# Patient Record
Sex: MALE | Race: ASIAN | NOT HISPANIC OR LATINO | URBAN - METROPOLITAN AREA
[De-identification: names, ages, dates, MRNs, and addresses within clinical notes are randomized per-mention and may not be internally consistent; named-entity substitution may affect disease eponyms.]

---

## 2023-04-05 ENCOUNTER — INPATIENT (INPATIENT)
Facility: HOSPITAL | Age: 43
LOS: 0 days | Discharge: HOME | DRG: 375 | End: 2023-04-06
Attending: STUDENT IN AN ORGANIZED HEALTH CARE EDUCATION/TRAINING PROGRAM | Admitting: STUDENT IN AN ORGANIZED HEALTH CARE EDUCATION/TRAINING PROGRAM
Payer: MEDICAID

## 2023-04-05 VITALS
RESPIRATION RATE: 18 BRPM | OXYGEN SATURATION: 99 % | SYSTOLIC BLOOD PRESSURE: 96 MMHG | DIASTOLIC BLOOD PRESSURE: 68 MMHG | HEART RATE: 90 BPM | WEIGHT: 153 LBS | TEMPERATURE: 99 F

## 2023-04-05 DIAGNOSIS — R65.10 SYSTEMIC INFLAMMATORY RESPONSE SYNDROME (SIRS) OF NON-INFECTIOUS ORIGIN WITHOUT ACUTE ORGAN DYSFUNCTION: ICD-10-CM

## 2023-04-05 DIAGNOSIS — C20 MALIGNANT NEOPLASM OF RECTUM: ICD-10-CM

## 2023-04-05 DIAGNOSIS — D64.9 ANEMIA, UNSPECIFIED: ICD-10-CM

## 2023-04-05 DIAGNOSIS — K92.2 GASTROINTESTINAL HEMORRHAGE, UNSPECIFIED: ICD-10-CM

## 2023-04-05 DIAGNOSIS — Z90.49 ACQUIRED ABSENCE OF OTHER SPECIFIED PARTS OF DIGESTIVE TRACT: Chronic | ICD-10-CM

## 2023-04-05 DIAGNOSIS — Z90.49 ACQUIRED ABSENCE OF OTHER SPECIFIED PARTS OF DIGESTIVE TRACT: ICD-10-CM

## 2023-04-05 DIAGNOSIS — Z29.9 ENCOUNTER FOR PROPHYLACTIC MEASURES, UNSPECIFIED: ICD-10-CM

## 2023-04-05 DIAGNOSIS — R50.9 FEVER, UNSPECIFIED: ICD-10-CM

## 2023-04-05 LAB
ALBUMIN SERPL ELPH-MCNC: 2.7 G/DL — LOW (ref 3.5–5)
ALP SERPL-CCNC: 123 U/L — HIGH (ref 40–120)
ALT FLD-CCNC: 40 U/L DA — SIGNIFICANT CHANGE UP (ref 10–60)
ANION GAP SERPL CALC-SCNC: 5 MMOL/L — SIGNIFICANT CHANGE UP (ref 5–17)
ANISOCYTOSIS BLD QL: SLIGHT — SIGNIFICANT CHANGE UP
APPEARANCE UR: CLEAR — SIGNIFICANT CHANGE UP
APTT BLD: 30.1 SEC — SIGNIFICANT CHANGE UP (ref 27.5–35.5)
AST SERPL-CCNC: 49 U/L — HIGH (ref 10–40)
BACTERIA # UR AUTO: ABNORMAL /HPF
BASOPHILS # BLD AUTO: 0.02 K/UL — SIGNIFICANT CHANGE UP (ref 0–0.2)
BASOPHILS NFR BLD AUTO: 0.4 % — SIGNIFICANT CHANGE UP (ref 0–2)
BILIRUB SERPL-MCNC: 0.4 MG/DL — SIGNIFICANT CHANGE UP (ref 0.2–1.2)
BILIRUB UR-MCNC: NEGATIVE — SIGNIFICANT CHANGE UP
BLD GP AB SCN SERPL QL: SIGNIFICANT CHANGE UP
BUN SERPL-MCNC: 12 MG/DL — SIGNIFICANT CHANGE UP (ref 7–18)
CALCIUM SERPL-MCNC: 8.4 MG/DL — SIGNIFICANT CHANGE UP (ref 8.4–10.5)
CHLORIDE SERPL-SCNC: 105 MMOL/L — SIGNIFICANT CHANGE UP (ref 96–108)
CO2 SERPL-SCNC: 22 MMOL/L — SIGNIFICANT CHANGE UP (ref 22–31)
COLOR SPEC: YELLOW — SIGNIFICANT CHANGE UP
CREAT SERPL-MCNC: 0.5 MG/DL — SIGNIFICANT CHANGE UP (ref 0.5–1.3)
DIFF PNL FLD: NEGATIVE — SIGNIFICANT CHANGE UP
EGFR: 131 ML/MIN/1.73M2 — SIGNIFICANT CHANGE UP
EOSINOPHIL # BLD AUTO: 0.11 K/UL — SIGNIFICANT CHANGE UP (ref 0–0.5)
EOSINOPHIL NFR BLD AUTO: 2.4 % — SIGNIFICANT CHANGE UP (ref 0–6)
EPI CELLS # UR: ABNORMAL /HPF
FLUAV AG NPH QL: SIGNIFICANT CHANGE UP
FLUBV AG NPH QL: SIGNIFICANT CHANGE UP
GLUCOSE SERPL-MCNC: 113 MG/DL — HIGH (ref 70–99)
GLUCOSE UR QL: NEGATIVE — SIGNIFICANT CHANGE UP
HCT VFR BLD CALC: 18.8 % — CRITICAL LOW (ref 39–50)
HGB BLD-MCNC: 5.9 G/DL — CRITICAL LOW (ref 13–17)
HYPOCHROMIA BLD QL: SLIGHT — SIGNIFICANT CHANGE UP
IMM GRANULOCYTES NFR BLD AUTO: 0.9 % — SIGNIFICANT CHANGE UP (ref 0–0.9)
INR BLD: 1.15 RATIO — SIGNIFICANT CHANGE UP (ref 0.88–1.16)
KETONES UR-MCNC: NEGATIVE — SIGNIFICANT CHANGE UP
LDH SERPL L TO P-CCNC: 244 U/L — HIGH (ref 120–225)
LEUKOCYTE ESTERASE UR-ACNC: NEGATIVE — SIGNIFICANT CHANGE UP
LYMPHOCYTES # BLD AUTO: 0.71 K/UL — LOW (ref 1–3.3)
LYMPHOCYTES # BLD AUTO: 15.2 % — SIGNIFICANT CHANGE UP (ref 13–44)
MACROCYTES BLD QL: SLIGHT — SIGNIFICANT CHANGE UP
MAGNESIUM SERPL-MCNC: 2 MG/DL — SIGNIFICANT CHANGE UP (ref 1.6–2.6)
MANUAL SMEAR VERIFICATION: SIGNIFICANT CHANGE UP
MCHC RBC-ENTMCNC: 29.9 PG — SIGNIFICANT CHANGE UP (ref 27–34)
MCHC RBC-ENTMCNC: 31.4 GM/DL — LOW (ref 32–36)
MCV RBC AUTO: 95.4 FL — SIGNIFICANT CHANGE UP (ref 80–100)
MICROCYTES BLD QL: SLIGHT — SIGNIFICANT CHANGE UP
MONOCYTES # BLD AUTO: 0.88 K/UL — SIGNIFICANT CHANGE UP (ref 0–0.9)
MONOCYTES NFR BLD AUTO: 18.8 % — HIGH (ref 2–14)
NEUTROPHILS # BLD AUTO: 2.91 K/UL — SIGNIFICANT CHANGE UP (ref 1.8–7.4)
NEUTROPHILS NFR BLD AUTO: 62.3 % — SIGNIFICANT CHANGE UP (ref 43–77)
NITRITE UR-MCNC: NEGATIVE — SIGNIFICANT CHANGE UP
NRBC # BLD: 0 /100 WBCS — SIGNIFICANT CHANGE UP (ref 0–0)
OVALOCYTES BLD QL SMEAR: SLIGHT — SIGNIFICANT CHANGE UP
PH UR: 7 — SIGNIFICANT CHANGE UP (ref 5–8)
PLAT MORPH BLD: NORMAL — SIGNIFICANT CHANGE UP
PLATELET # BLD AUTO: 84 K/UL — LOW (ref 150–400)
PLATELET COUNT - ESTIMATE: NORMAL — SIGNIFICANT CHANGE UP
POIKILOCYTOSIS BLD QL AUTO: SLIGHT — SIGNIFICANT CHANGE UP
POLYCHROMASIA BLD QL SMEAR: SLIGHT — SIGNIFICANT CHANGE UP
POTASSIUM SERPL-MCNC: 4.3 MMOL/L — SIGNIFICANT CHANGE UP (ref 3.5–5.3)
POTASSIUM SERPL-SCNC: 4.3 MMOL/L — SIGNIFICANT CHANGE UP (ref 3.5–5.3)
PROT SERPL-MCNC: 5.8 G/DL — LOW (ref 6–8.3)
PROT UR-MCNC: 30 MG/DL
PROTHROM AB SERPL-ACNC: 13.7 SEC — HIGH (ref 10.5–13.4)
RBC # BLD: 1.97 M/UL — LOW (ref 4.2–5.8)
RBC # FLD: 19.5 % — HIGH (ref 10.3–14.5)
RBC BLD AUTO: ABNORMAL
RBC CASTS # UR COMP ASSIST: SIGNIFICANT CHANGE UP /HPF (ref 0–2)
RETICS #: 176.8 K/UL — HIGH (ref 25–125)
RETICS/RBC NFR: 8.9 % — HIGH (ref 0.5–2.5)
SARS-COV-2 RNA SPEC QL NAA+PROBE: SIGNIFICANT CHANGE UP
SODIUM SERPL-SCNC: 132 MMOL/L — LOW (ref 135–145)
SP GR SPEC: 1.01 — SIGNIFICANT CHANGE UP (ref 1.01–1.02)
UROBILINOGEN FLD QL: 1 MG/DL
WBC # BLD: 4.67 K/UL — SIGNIFICANT CHANGE UP (ref 3.8–10.5)
WBC # FLD AUTO: 4.67 K/UL — SIGNIFICANT CHANGE UP (ref 3.8–10.5)
WBC UR QL: SIGNIFICANT CHANGE UP /HPF (ref 0–5)

## 2023-04-05 PROCEDURE — 74174 CTA ABD&PLVS W/CONTRAST: CPT | Mod: 26

## 2023-04-05 PROCEDURE — 99292 CRITICAL CARE ADDL 30 MIN: CPT

## 2023-04-05 PROCEDURE — 99222 1ST HOSP IP/OBS MODERATE 55: CPT

## 2023-04-05 PROCEDURE — 99291 CRITICAL CARE FIRST HOUR: CPT

## 2023-04-05 RX ORDER — SODIUM CHLORIDE 9 MG/ML
500 INJECTION INTRAMUSCULAR; INTRAVENOUS; SUBCUTANEOUS ONCE
Refills: 0 | Status: COMPLETED | OUTPATIENT
Start: 2023-04-05 | End: 2023-04-05

## 2023-04-05 RX ORDER — SPIRONOLACTONE 25 MG/1
0 TABLET, FILM COATED ORAL
Qty: 0 | Refills: 3 | DISCHARGE

## 2023-04-05 RX ORDER — ONDANSETRON 8 MG/1
1 TABLET, FILM COATED ORAL
Refills: 0 | DISCHARGE

## 2023-04-05 RX ORDER — OXYCODONE HYDROCHLORIDE 5 MG/1
2.5 TABLET ORAL EVERY 6 HOURS
Refills: 0 | Status: DISCONTINUED | OUTPATIENT
Start: 2023-04-05 | End: 2023-04-06

## 2023-04-05 RX ORDER — ACETAMINOPHEN 500 MG
975 TABLET ORAL ONCE
Refills: 0 | Status: COMPLETED | OUTPATIENT
Start: 2023-04-05 | End: 2023-04-05

## 2023-04-05 RX ORDER — FUROSEMIDE 40 MG
1 TABLET ORAL
Refills: 0 | DISCHARGE

## 2023-04-05 RX ORDER — DOCUSATE SODIUM 100 MG
1 CAPSULE ORAL
Refills: 0 | DISCHARGE

## 2023-04-05 RX ORDER — FUROSEMIDE 40 MG
0 TABLET ORAL
Qty: 0 | Refills: 0 | DISCHARGE

## 2023-04-05 RX ORDER — SPIRONOLACTONE 25 MG/1
1 TABLET, FILM COATED ORAL
Refills: 0 | DISCHARGE

## 2023-04-05 RX ORDER — LANOLIN ALCOHOL/MO/W.PET/CERES
3 CREAM (GRAM) TOPICAL AT BEDTIME
Refills: 0 | Status: DISCONTINUED | OUTPATIENT
Start: 2023-04-05 | End: 2023-04-06

## 2023-04-05 RX ORDER — ONDANSETRON 8 MG/1
4 TABLET, FILM COATED ORAL EVERY 8 HOURS
Refills: 0 | Status: DISCONTINUED | OUTPATIENT
Start: 2023-04-05 | End: 2023-04-06

## 2023-04-05 RX ORDER — OXYCODONE HYDROCHLORIDE 5 MG/1
5 TABLET ORAL EVERY 6 HOURS
Refills: 0 | Status: DISCONTINUED | OUTPATIENT
Start: 2023-04-05 | End: 2023-04-06

## 2023-04-05 RX ORDER — OMEPRAZOLE 10 MG/1
1 CAPSULE, DELAYED RELEASE ORAL
Refills: 0 | DISCHARGE

## 2023-04-05 RX ORDER — PANTOPRAZOLE SODIUM 20 MG/1
40 TABLET, DELAYED RELEASE ORAL
Refills: 0 | Status: DISCONTINUED | OUTPATIENT
Start: 2023-04-05 | End: 2023-04-06

## 2023-04-05 RX ORDER — OXYCODONE AND ACETAMINOPHEN 5; 325 MG/1; MG/1
1 TABLET ORAL
Refills: 0 | DISCHARGE

## 2023-04-05 RX ORDER — ACETAMINOPHEN 500 MG
650 TABLET ORAL EVERY 6 HOURS
Refills: 0 | Status: DISCONTINUED | OUTPATIENT
Start: 2023-04-05 | End: 2023-04-06

## 2023-04-05 RX ADMIN — PANTOPRAZOLE SODIUM 40 MILLIGRAM(S): 20 TABLET, DELAYED RELEASE ORAL at 18:58

## 2023-04-05 RX ADMIN — SODIUM CHLORIDE 500 MILLILITER(S): 9 INJECTION INTRAMUSCULAR; INTRAVENOUS; SUBCUTANEOUS at 18:50

## 2023-04-05 RX ADMIN — Medication 975 MILLIGRAM(S): at 17:14

## 2023-04-05 NOTE — CHART NOTE - NSCHARTNOTEFT_GEN_A_CORE
Patient's BP has been ranging in 75~87/49~60 mm Hg.    According to [facility] notes outpatient (when Pt has higher hemoglobin levels), patient's SBP has been ranging in the 80s to 90s for months.  Patient also admits to having low BP for months.    Patient, on admission, has lightheadedness and dyspnea on exertion from anemia.  With BP of 75/49mm Hg. He does not complain of worsening symptoms.  Patient denies any symptoms of possible infection including dysuria, inc urinary frequency, cough, malaise, abdominal pain.    Will try to aim to keep SBP>80.  He will need 2U of pRBC.   Will sign out to the night team.    Due to fever as well, sent cultures.

## 2023-04-05 NOTE — ED ADULT NURSE NOTE - ED STAT RN HANDOFF DETAILS
Patient is alert and oriented x 4, not in any form of distress. CT done and waiting for result. Vital signs referred to MAXX Fleming, no new orders as of the moment. To administer 2nd unit of PRBC. Endorsed to MAXX Sanchez for continuity of care.

## 2023-04-05 NOTE — CONSULT NOTE ADULT - SUBJECTIVE AND OBJECTIVE BOX
[facility] GI CONSULTATION    Patient is a 42y old  Male who presents with a chief complaint of   HPI:      PMH/PSH:  PAST MEDICAL & SURGICAL HISTORY:      FH:  FAMILY HISTORY:      MEDS:  MEDICATIONS  (STANDING):    MEDICATIONS  (PRN):    Allergies    No Known Allergies    Intolerances      ROS: A detailed set of ROS were asked and negative except those outlined in GI HPI.  ______________________________________________________________________  PHYSICAL EXAM:  T(C): 36.8 (04-05-23 @ 16:07), Max: 38.5 (04-05-23 @ 15:51)  HR: 88 (04-05-23 @ 15:51)  BP: 93/58 (04-05-23 @ 15:51)  RR: 18 (04-05-23 @ 15:51)  SpO2: 100% (04-05-23 @ 15:51)  Wt(kg): --      GEN: NAD  HEENT: EOMI, conjunctivae anicteric, neck supple, moist mucous membranes  PULM: LSCTAB, no wheezing, rales, or rhonchi  CV: RRR, no m/r/b  GI: Soft, NT, ND; +BS in all four quadrants, no ascites, no Stapleton's sign  MSK: GILLIAM, no edema  NEURO: A&O x 3, no gross deficits  ______________________________________________________________________  LABS:                        5.9    4.67  )-----------( 84       ( 05 Apr 2023 14:30 )             18.8     04-05    132<L>  |  105  |  12  ----------------------------<  113<H>  4.3   |  22  |  0.50    Ca    8.4      05 Apr 2023 14:30  Mg     2.0     04-05    TPro  5.8<L>  /  Alb  2.7<L>  /  TBili  0.4  /  DBili  x   /  AST  49<H>  /  ALT  40  /  AlkPhos  123<H>  04-05    LIVER FUNCTIONS - ( 05 Apr 2023 14:30 )  Alb: 2.7 g/dL / Pro: 5.8 g/dL / ALK PHOS: 123 U/L / ALT: 40 U/L DA / AST: 49 U/L / GGT: x           PT/INR - ( 05 Apr 2023 14:30 )   PT: 13.7 sec;   INR: 1.15 ratio         PTT - ( 05 Apr 2023 14:30 )  PTT:30.1 sec  ____________________________________________    IMAGING:       [facility] GI CONSULTATION    Patient is a 42y old  Male who presents with a chief complaint of anemia.  HPI:  42M with pmhx of metastatic rectal ca presenting with anemia. Notes also dark stools for the last 2 weeks intermittently. Denies any hematochezia, abd pain, n/v/d/c, hematemesis, or other issue.     PMH/PSH:  PAST MEDICAL & SURGICAL HISTORY:      FH:  FAMILY HISTORY:  Noncontributory.     MEDS:  MEDICATIONS  (STANDING):    MEDICATIONS  (PRN):    Allergies    No Known Allergies    Intolerances      ROS: A detailed set of ROS were asked and negative except those outlined in GI HPI.  ______________________________________________________________________  PHYSICAL EXAM:  T(C): 36.8 (04-05-23 @ 16:07), Max: 38.5 (04-05-23 @ 15:51)  HR: 88 (04-05-23 @ 15:51)  BP: 93/58 (04-05-23 @ 15:51)  RR: 18 (04-05-23 @ 15:51)  SpO2: 100% (04-05-23 @ 15:51)  Wt(kg): --      GEN: NAD  HEENT: EOMI, conjunctivae anicteric, neck supple, moist mucous membranes  PULM: LSCTAB, no wheezing, rales, or rhonchi  CV: RRR, no m/r/b  GI: Soft, NT, ND; +BS in all four quadrants, no ascites, no Stapleton's sign  MSK: GILLIAM, no edema  NEURO: A&O x 3, no gross deficits  ______________________________________________________________________  LABS:                        5.9    4.67  )-----------( 84       ( 05 Apr 2023 14:30 )             18.8     04-05    132<L>  |  105  |  12  ----------------------------<  113<H>  4.3   |  22  |  0.50    Ca    8.4      05 Apr 2023 14:30  Mg     2.0     04-05    TPro  5.8<L>  /  Alb  2.7<L>  /  TBili  0.4  /  DBili  x   /  AST  49<H>  /  ALT  40  /  AlkPhos  123<H>  04-05    LIVER FUNCTIONS - ( 05 Apr 2023 14:30 )  Alb: 2.7 g/dL / Pro: 5.8 g/dL / ALK PHOS: 123 U/L / ALT: 40 U/L DA / AST: 49 U/L / GGT: x           PT/INR - ( 05 Apr 2023 14:30 )   PT: 13.7 sec;   INR: 1.15 ratio         PTT - ( 05 Apr 2023 14:30 )  PTT:30.1 sec  ____________________________________________    IMAGING:

## 2023-04-05 NOTE — H&P ADULT - ASSESSMENT
Patient is a 42 male, with PMHx of rectal cancer (w/ lung nodules, liver mets s/p resection), who was sent by Dr. Ho for Hgb of 6.1 at [facility] today. Admitted for Symptomatic anemia.

## 2023-04-05 NOTE — H&P ADULT - PROBLEM SELECTOR PLAN 4
- Hx of liver mets  - S/p liver trisegmentectomy resection 10/2022  - On furosemide 20mg daily, spironolactone 25mg daily for ascites prevention  - Hold home meds on 4/5 due to low BP  - Consider resuming on 4/6 if BP is stable at adequate level

## 2023-04-05 NOTE — ED PROVIDER NOTE - OBJECTIVE STATEMENT
42M, [facility] of rectal carcinoma (liver mets s/p resection), sent to the emergency department for anemia. patient reports he has been feeling weak and becomes short of breath when he walks. has not had chemotherapy for  the past 2 weeks because his hemoglobin has been low. last week it was 8 and today it was found to be 6. no chest pain or palpitations. reports dark stools but no bright red blood.

## 2023-04-05 NOTE — ED PROVIDER NOTE - PROGRESS NOTE DETAILS
left message for Dr. Ho to inform him of plan to admit patient. will consult REBEL. Dillon Williamson GI consulted. Dillon Williamson

## 2023-04-05 NOTE — ED PROVIDER NOTE - CLINICAL SUMMARY MEDICAL DECISION MAKING FREE TEXT BOX
42M presenting with anemia and REES. no active rectal bleeding, but concern for GI bleed vs anemia of chronic disease. will likely need blood transfusion and admission. 42M presenting with anemia and REES. no active rectal bleeding, but concern for GI bleed vs anemia of chronic disease. will likely need blood transfusion and admission.    Oncologist: Dr. Ray Ho

## 2023-04-05 NOTE — H&P ADULT - PROBLEM SELECTOR PLAN 1
- P/w Hgb 5.9  - Complains of 2 weeks of lightheadedness, dyspnea on exertion, and intermittent dark stools  - Hgb has been steadily trending down over months (Hgb 14 on Sept 2022)  - Most recently, Hgb 11.4 on 3/22/23  - Iron panel from 3/29/23 = Total iron 51, Unsat , TIBC 313, Iron sat 16%, Ferritin 511(elevated), Vit B12 525, Folate >20  - Likely due to antineoplastic therapy +/- occult bleeding from the tumor  - Transfuse 2U of pRBC  - F/u Retic count, haptoglobin, LDH  - GI Dr. Hutton consulted = Clear liq diet, IV PPI BID

## 2023-04-05 NOTE — H&P ADULT - PROBLEM SELECTOR PLAN 3
- Has fever of 100.8  - No other signs of infection  - Will hold on sending the cultures for now  - Tylenol PRN - Has fever of 100.8, hypotension  - No other signs of infection, no symptoms of infection  - Tylenol PRN for fever  - SBP is in the 80s despite 1u prbc and 500mL NS bolus.  - Patient says his SBP has been running in the 80s and 90s at doctor's offices recently  - Will send cultures for now

## 2023-04-05 NOTE — H&P ADULT - NSHPPHYSICALEXAM_GEN_ALL_CORE
GENERAL: NAD, well-groomed, well-developed  HEAD:  Atraumatic, Normocephalic  EYES: EOMI, PERRLA, conjunctiva and sclera clear  ENMT: No tonsillar erythema, exudates, or enlargement; Moist mucous membranes, No lesions  NECK: Supple, normal appearance, No JVD; Normal thyroid; Trachea midline  NERVOUS SYSTEM:  Alert & Oriented X3,  Motor Strength 5/5 B/L upper and lower extremities, sensation intact  CHEST/LUNG: Lungs clear to auscultation bilaterally, No rales, rhonchi, wheezing   HEART: Regular rate and rhythm; No murmurs, rubs, or gallops  ABDOMEN: Soft, Nontender, Nondistended; Bowel sounds present  EXTREMITIES:  2+ Peripheral Pulses, No clubbing, cyanosis, or edema  LYMPH: No lymphadenopathy noted  SKIN: No rashes or lesions;  Good capillary refill

## 2023-04-05 NOTE — H&P ADULT - HISTORY OF PRESENT ILLNESS
Patient is a 42 male, with PMHx of rectal cancer (w/ lung nodules, liver mets s/p resection), Patient was sent by Dr. Ho for Hgb of 6.1 at [facility] today. He complains of 2 weeks of lightheadedness, dyspnea on exertion, and intermittent dark stools(avg of 1 stool per day). He was found to have anemia of 11.4 on 3/22/23 so his chemo has been held since then. Patient denies headache, nausea, vomit, chest pain, shortness of breath, cough, lightheadedness, abdominal pain, diarrhea, constipation, dysuria, hematuria.   Patient is a 42 male, with PMHx of rectal cancer (w/ lung nodules, liver mets s/p resection), who was sent by Dr. Ho for Hgb of 6.1 at [facility] today. He complains of 2 weeks of lightheadedness, dyspnea on exertion, and intermittent dark stools(avg of 1 stool per day). He was found to have anemia of 11.4 on 3/22/23 so his chemo has been held since then. Patient denies headache, nausea, vomit, chest pain, shortness of breath, cough, lightheadedness, abdominal pain, diarrhea, constipation, dysuria, hematuria.

## 2023-04-05 NOTE — ED ADULT NURSE REASSESSMENT NOTE - NS ED NURSE REASSESS COMMENT FT1
1/2 RBC transfusion completed; no adverse reactions; pt is hypotensive; pt denies any SOB, chest pain, or dizziness; PO fluid intake initiated; no apparent distress.
Dr. Williamson notified that pt has a fever.
Second type and screen collected and sent to lab
transfusion in progress by primary RN , BP is 87/51 spoke with Dr Valle , offered po fluids per recommendation . Pt remains asymptomatic of hypotension

## 2023-04-05 NOTE — ED ADULT NURSE NOTE - OBJECTIVE STATEMENT
Pt was referred for low hemoglobin count; pt has history of rectal cancer; pt denies sob or chest pain but reports dizziness; pt denies any falls; no apparent distress; will continue to monitor.

## 2023-04-05 NOTE — H&P ADULT - NSHPREVIEWOFSYSTEMS_GEN_ALL_CORE
CONSTITUTIONAL: No fever, chills, weight loss, or generalized weakness  EYES: No eye pain, visual disturbances, or discharge  ENT:  No difficulty hearing, tinnitus, vertigo; No sinus or throat pain  NECK: No pain or stiffness  RESPIRATORY: No cough, wheezing, or hemoptysis; (+)Dyspnea on admission  CARDIOVASCULAR: No chest pain, palpitations, (+)Lightheadedness, or leg swelling  GASTROINTESTINAL: No abdominal or epigastric pain. No nausea, vomiting, or hematemesis; No diarrhea or constipation. (+)Melena  GENITOURINARY: No dysuria, frequency, hematuria, or incontinence  NEUROLOGICAL: No headaches, memory loss, loss of strength, numbness, or tremors  SKIN: No itching, burning, rashes, or lesions   LYMPH Nodes: No enlarged glands  ENDOCRINE: No heat or cold intolerance; No hair loss  MUSCULOSKELETAL: No joint pain or swelling; No muscle, back, No extremity pain  PSYCHIATRIC: No depression, anxiety, mood swings, or difficulty sleeping  HEME/LYMPH: No easy bruising, or bleeding gums  ALLERGY AND IMMUNOLOGIC: No hives or eczema

## 2023-04-05 NOTE — CONSULT NOTE ADULT - ASSESSMENT
Patient is a 42M with a PMHx of rectal carcinoma (with liver mets s/p resection), who presented to the ED for anemia. GI was consulted for GIB.    Patient states he's had dark brown "sticky" stools x 2 weeks (not on a daily occurrence), last BM yesterday. He was instructed to go to the ED for anemia, also reports dark stools but no bright red blood, endorses weakness and sob with ambulation, did not receive chemo x 2 weeks due to anemia. MARY deferred at time of eval given patient had the urge to have a BM, agreed to take a picture and show it to provider tomorrow. Denies n/v,dyspepsia. He has never had prior blood transfusions.     He follows Dr. Ray Ho at [facility], last seen 3/29. He complained of abdominal pain 2 years ago, had EGD/COLO (5/21/21),  notable for ulcerated and fungating mass in rectum at 4cm from anal verge c/w moderately differentiated adenocarcinoma. Per chart review:  10/3/22: s/p liver trisegmentectomy resection, path showed multiple foci of metastatic moderately-differentiated adenocarcinoma c/w colorectal primary.  1/25/23 Hgb 8.8, plt 115.   3/11/23: Hgb 10  3/17/23: s/p IR guided liver ablation at [facility]  3/29/23: Hgb 8.4, MCV 93.2, plt 95    In the ED, labs notable for Hgb 5.9, MCV 95.4, INR 1.15, BUN 12, Cr 0.5, TB 0.4, , AST 49, ALT 40. Ordered for 2u PRBCs.     #GIB  #Anemia  #Rectal carcinoma  #s/p liver trisegmentectomy resection  Patient is most likely anemic from his rectal mass. Alk Phos and ALT slightly elevated most likely iso previous chemotherapy.   Given HD stability and high suspicion of GIB source, conservative management at this time. No indication for GI intervention.    	- Please obtain CT Angio A/P   	- Conservative management  	- CLD   	- Obtain post-transfusion H/H  	- Maintain active T&S, 2 large bore peripheral IVs, transfuse for goal Hgb >7 and goal plt >50  	- Trend H/H and PT/INR  	- HD stability per primary team  	- IV Protonix 40mg BID    This note and its recommendations herein are preliminary until such time as cosigned by an attending.    GI will continue to follow.  Thank you for this consult! Patient is a 42M with a PMHx of rectal carcinoma (with liver mets s/p resection), who presented to the ED for anemia. GI was consulted for GIB.    Patient states he's had dark brown "sticky" stools x 2 weeks (not on a daily occurrence), last BM yesterday. He was instructed to go to the ED for anemia, also reports dark stools but no bright red blood, endorses weakness and sob with ambulation, did not receive chemo x 2 weeks due to anemia. MARY deferred at time of eval given patient had the urge to have a BM, agreed to take a picture and show it to provider tomorrow. Denies n/v,dyspepsia. He has never had prior blood transfusions.     He follows Dr. Ray Ho at [facility], last seen 3/29. He complained of abdominal pain 2 years ago, had EGD/COLO (5/21/21),  notable for ulcerated and fungating mass in rectum at 4cm from anal verge c/w moderately differentiated adenocarcinoma. Per chart review:  10/3/22: s/p liver trisegmentectomy resection, path showed multiple foci of metastatic moderately-differentiated adenocarcinoma c/w colorectal primary.  1/25/23 Hgb 8.8, plt 115.   3/11/23: Hgb 10  3/17/23: s/p IR guided liver ablation at [facility]  3/29/23: Hgb 8.4, MCV 93.2, plt 95    In the ED, labs notable for Hgb 5.9, MCV 95.4, INR 1.15, BUN 12, Cr 0.5, TB 0.4, , AST 49, ALT 40. Ordered for 2u PRBCs.     #GIB  #Anemia  #Rectal carcinoma  #s/p liver trisegmentectomy resection  Suspect chronic bleeding from known rectal malignancy. Alk Phos and ALT slightly elevated most likely iso previous chemotherapy.   Given HD stability and high suspicion of GIB source, conservative management at this time. No indication for GI intervention.    	- Please obtain CT Angio A/P evaluate for any other source and for any active bleed  	- Conservative management  	- CLD   	- Obtain post-transfusion H/H  	- Maintain active T&S, 2 large bore peripheral IVs, transfuse for goal Hgb >7 and goal plt >50  	- Trend H/H and PT/INR  	- HD stability per primary team  	- IV Protonix 40mg BID    This note and its recommendations herein are preliminary until such time as cosigned by an attending.    GI will continue to follow.  Thank you for this consult!

## 2023-04-05 NOTE — H&P ADULT - PROBLEM SELECTOR PLAN 5
- Follows Dr. Ho at [facility]  - Not on chemo for the past 2 weeks due to anemia  - Tylenol, Oxycodone PRN for pain  - [facility] consulted

## 2023-04-06 ENCOUNTER — TRANSCRIPTION ENCOUNTER (OUTPATIENT)
Age: 43
End: 2023-04-06

## 2023-04-06 VITALS
RESPIRATION RATE: 16 BRPM | OXYGEN SATURATION: 95 % | TEMPERATURE: 98 F | HEART RATE: 77 BPM | DIASTOLIC BLOOD PRESSURE: 51 MMHG | SYSTOLIC BLOOD PRESSURE: 89 MMHG

## 2023-04-06 LAB
ALBUMIN SERPL ELPH-MCNC: 2.6 G/DL — LOW (ref 3.5–5)
ALP SERPL-CCNC: 123 U/L — HIGH (ref 40–120)
ALT FLD-CCNC: 35 U/L DA — SIGNIFICANT CHANGE UP (ref 10–60)
ANION GAP SERPL CALC-SCNC: 4 MMOL/L — LOW (ref 5–17)
AST SERPL-CCNC: 42 U/L — HIGH (ref 10–40)
BASOPHILS # BLD AUTO: 0.04 K/UL — SIGNIFICANT CHANGE UP (ref 0–0.2)
BASOPHILS NFR BLD AUTO: 0.8 % — SIGNIFICANT CHANGE UP (ref 0–2)
BILIRUB SERPL-MCNC: 1.7 MG/DL — HIGH (ref 0.2–1.2)
BUN SERPL-MCNC: 8 MG/DL — SIGNIFICANT CHANGE UP (ref 7–18)
CALCIUM SERPL-MCNC: 8.6 MG/DL — SIGNIFICANT CHANGE UP (ref 8.4–10.5)
CHLORIDE SERPL-SCNC: 111 MMOL/L — HIGH (ref 96–108)
CO2 SERPL-SCNC: 25 MMOL/L — SIGNIFICANT CHANGE UP (ref 22–31)
CREAT SERPL-MCNC: 0.42 MG/DL — LOW (ref 0.5–1.3)
CULTURE RESULTS: NO GROWTH — SIGNIFICANT CHANGE UP
EGFR: 139 ML/MIN/1.73M2 — SIGNIFICANT CHANGE UP
EOSINOPHIL # BLD AUTO: 0.12 K/UL — SIGNIFICANT CHANGE UP (ref 0–0.5)
EOSINOPHIL NFR BLD AUTO: 2.4 % — SIGNIFICANT CHANGE UP (ref 0–6)
GLUCOSE SERPL-MCNC: 90 MG/DL — SIGNIFICANT CHANGE UP (ref 70–99)
HAPTOGLOB SERPL-MCNC: 177 MG/DL — SIGNIFICANT CHANGE UP (ref 34–200)
HCT VFR BLD CALC: 24.8 % — LOW (ref 39–50)
HGB BLD-MCNC: 8.1 G/DL — LOW (ref 13–17)
IMM GRANULOCYTES NFR BLD AUTO: 0.8 % — SIGNIFICANT CHANGE UP (ref 0–0.9)
LYMPHOCYTES # BLD AUTO: 0.61 K/UL — LOW (ref 1–3.3)
LYMPHOCYTES # BLD AUTO: 12.2 % — LOW (ref 13–44)
MAGNESIUM SERPL-MCNC: 2 MG/DL — SIGNIFICANT CHANGE UP (ref 1.6–2.6)
MCHC RBC-ENTMCNC: 29.6 PG — SIGNIFICANT CHANGE UP (ref 27–34)
MCHC RBC-ENTMCNC: 32.7 GM/DL — SIGNIFICANT CHANGE UP (ref 32–36)
MCV RBC AUTO: 90.5 FL — SIGNIFICANT CHANGE UP (ref 80–100)
MONOCYTES # BLD AUTO: 0.92 K/UL — HIGH (ref 0–0.9)
MONOCYTES NFR BLD AUTO: 18.4 % — HIGH (ref 2–14)
NEUTROPHILS # BLD AUTO: 3.26 K/UL — SIGNIFICANT CHANGE UP (ref 1.8–7.4)
NEUTROPHILS NFR BLD AUTO: 65.4 % — SIGNIFICANT CHANGE UP (ref 43–77)
NRBC # BLD: 0 /100 WBCS — SIGNIFICANT CHANGE UP (ref 0–0)
PHOSPHATE SERPL-MCNC: 3.8 MG/DL — SIGNIFICANT CHANGE UP (ref 2.5–4.5)
PLATELET # BLD AUTO: 81 K/UL — LOW (ref 150–400)
POTASSIUM SERPL-MCNC: 4.2 MMOL/L — SIGNIFICANT CHANGE UP (ref 3.5–5.3)
POTASSIUM SERPL-SCNC: 4.2 MMOL/L — SIGNIFICANT CHANGE UP (ref 3.5–5.3)
PROT SERPL-MCNC: 5.9 G/DL — LOW (ref 6–8.3)
RBC # BLD: 2.74 M/UL — LOW (ref 4.2–5.8)
RBC # FLD: 19.4 % — HIGH (ref 10.3–14.5)
SODIUM SERPL-SCNC: 140 MMOL/L — SIGNIFICANT CHANGE UP (ref 135–145)
SPECIMEN SOURCE: SIGNIFICANT CHANGE UP
WBC # BLD: 4.99 K/UL — SIGNIFICANT CHANGE UP (ref 3.8–10.5)
WBC # FLD AUTO: 4.99 K/UL — SIGNIFICANT CHANGE UP (ref 3.8–10.5)

## 2023-04-06 PROCEDURE — 84100 ASSAY OF PHOSPHORUS: CPT

## 2023-04-06 PROCEDURE — 80053 COMPREHEN METABOLIC PANEL: CPT

## 2023-04-06 PROCEDURE — 99232 SBSQ HOSP IP/OBS MODERATE 35: CPT

## 2023-04-06 PROCEDURE — 86850 RBC ANTIBODY SCREEN: CPT

## 2023-04-06 PROCEDURE — 74174 CTA ABD&PLVS W/CONTRAST: CPT | Mod: MG

## 2023-04-06 PROCEDURE — 36415 COLL VENOUS BLD VENIPUNCTURE: CPT

## 2023-04-06 PROCEDURE — 99239 HOSP IP/OBS DSCHRG MGMT >30: CPT

## 2023-04-06 PROCEDURE — 85025 COMPLETE CBC W/AUTO DIFF WBC: CPT

## 2023-04-06 PROCEDURE — 36430 TRANSFUSION BLD/BLD COMPNT: CPT

## 2023-04-06 PROCEDURE — G1004: CPT

## 2023-04-06 PROCEDURE — 87040 BLOOD CULTURE FOR BACTERIA: CPT

## 2023-04-06 PROCEDURE — 83010 ASSAY OF HAPTOGLOBIN QUANT: CPT

## 2023-04-06 PROCEDURE — P9040: CPT

## 2023-04-06 PROCEDURE — 86901 BLOOD TYPING SEROLOGIC RH(D): CPT

## 2023-04-06 PROCEDURE — 86900 BLOOD TYPING SEROLOGIC ABO: CPT

## 2023-04-06 PROCEDURE — 86923 COMPATIBILITY TEST ELECTRIC: CPT

## 2023-04-06 PROCEDURE — 85610 PROTHROMBIN TIME: CPT

## 2023-04-06 PROCEDURE — 99285 EMERGENCY DEPT VISIT HI MDM: CPT

## 2023-04-06 PROCEDURE — 85045 AUTOMATED RETICULOCYTE COUNT: CPT

## 2023-04-06 PROCEDURE — 83615 LACTATE (LD) (LDH) ENZYME: CPT

## 2023-04-06 PROCEDURE — 87086 URINE CULTURE/COLONY COUNT: CPT

## 2023-04-06 PROCEDURE — 83735 ASSAY OF MAGNESIUM: CPT

## 2023-04-06 PROCEDURE — 85730 THROMBOPLASTIN TIME PARTIAL: CPT

## 2023-04-06 PROCEDURE — 81001 URINALYSIS AUTO W/SCOPE: CPT

## 2023-04-06 PROCEDURE — 87637 SARSCOV2&INF A&B&RSV AMP PRB: CPT

## 2023-04-06 PROCEDURE — 93005 ELECTROCARDIOGRAM TRACING: CPT

## 2023-04-06 RX ADMIN — PANTOPRAZOLE SODIUM 40 MILLIGRAM(S): 20 TABLET, DELAYED RELEASE ORAL at 05:54

## 2023-04-06 NOTE — DISCHARGE NOTE NURSING/CASE MANAGEMENT/SOCIAL WORK - PATIENT PORTAL LINK FT
You can access the FollowMyHealth Patient Portal offered by [facility] by registering at the following website: [email or web address]. By joining [facility]’s FollowMyHealth portal, you will also be able to view your health information using other applications (apps) compatible with our system.

## 2023-04-06 NOTE — DISCHARGE NOTE PROVIDER - HOSPITAL COURSE
42 male, with PMHx of rectal cancer (w/ lung nodules, liver mets s/p resection), who was sent by Dr. Ho for Hgb of 6.1 at [facility]    Pt  complains of 2 weeks of lightheadedness, dyspnea on exertion, and intermittent dark stools He was found to have anemia of 11.4 on 3/22/23 so his chemo has been held since then  Admitted for Symptomatic anemia.  CT Angio A/P: right hepatectomy, irregular branching hypoattenuation throughout left hepatic lobe, no active bleed, rectal mass spanning 5cm in length, cavernous transformation of portal vein, pre-existnig contrast present throughout colon limits eval of active bleed.    Found to have Hg of 5.9   Received 2 units of PRBC with improvement in Hg to 8.1  Pt was seen by Gastroenterology team   As per GI, given HD stability and high suspicion of GIB source, conservative management was recommended,   indication for inpatient GI intervention.  Recommended outpatient follow up with hem/oncology and GI to re-evaluate with colonoscopy for obtaining additional biopsy

## 2023-04-06 NOTE — CONSULT NOTE ADULT - NS ATTEND AMEND GEN_ALL_CORE FT
Patient seen and examined. Presenting with anemia, Hb 5. Receiving PRBCs. Does note chronic intermittent dark stools, particularly over the last 2 weeks. Recommend PPI IV BID. Suspect malignancy related bleeding given hx history which is generally poorly responsive to endoscopic therapy. Obtain CT angio to evaluate for any source. Goal Hb>7-8. Will follow.       Total time spent to complete patient's bedside assessment, physical examination, review medical chart including labs & imaging, discuss medical plan of care with housestaff was more than 50 minutes
CC: Stage IV rectal ca; anemia  HPI:  42 male, with PMHx of stage IV rectal cancer on palliative chemothearpy p/w symptomatic Hgb of 6.1. He complains of 2 weeks of lightheadedness, dyspnea on exertion, and intermittent dark stools(avg of 1 stool per day). He was found to have anemia of 11.4 on 3/22/23 so his chemo has been held since then  INTERVAL HPI: Patient seen and examined at bedside; Events noted; Patient feels improved post PRBC.     PMH/PSH as above    FmHx/Sox Hx NC    ROS as above; pt is not able to provide detailed review of systems  General: Noncontributory;	Skin/Breast: NC;Ophthalmologic:NC; ENMT: NC; Respiratory and Thorax: NC; Cardiovascular: NC; 	  Gastrointestinal: NC; Genitourinary:NC; 	Musculoskeletal:NC; Neurological: NC; Psychiatric: NC; Hematology/Lymphatics: NC; Endocrine: NC; Allergic/Immunologic: NC    MEDICATIONS  (STANDING):  pantoprazole  Injectable 40 milliGRAM(s) IV Push two times a day    MEDICATIONS  (PRN):  acetaminophen     Tablet .. 650 milliGRAM(s) Oral every 6 hours PRN Temp greater or equal to 38C (100.4F), Mild Pain (1 - 3)  aluminum hydroxide/magnesium hydroxide/simethicone Suspension 30 milliLiter(s) Oral every 4 hours PRN Dyspepsia  melatonin 3 milliGRAM(s) Oral at bedtime PRN Insomnia  ondansetron Injectable 4 milliGRAM(s) IV Push every 8 hours PRN Nausea and/or Vomiting  oxyCODONE    IR 5 milliGRAM(s) Oral every 6 hours PRN Severe Pain (7 - 10)  oxyCODONE    IR 2.5 milliGRAM(s) Oral every 6 hours PRN Moderate Pain (4 - 6)      Vital Signs Last 24 Hrs  T(C): 36.9 (06 Apr 2023 14:31), Max: 38.2 (05 Apr 2023 17:13)  T(F): 98.5 (06 Apr 2023 14:31), Max: 100.8 (05 Apr 2023 17:13)  HR: 77 (06 Apr 2023 14:31) (65 - 95)  BP: 89/51 (06 Apr 2023 14:31) (75/49 - 95/59)  BP(mean): 63 (06 Apr 2023 05:51) (54 - 69)  RR: 16 (06 Apr 2023 14:31) (16 - 18)  SpO2: 95% (06 Apr 2023 14:31) (95% - 100%)    Parameters below as of 06 Apr 2023 14:31  Patient On (Oxygen Delivery Method): room air      _________________  PHYSICAL EXAM:  ---------------------------  GEN: NAD; NC/AT; A and O x 3  LUNGS: no wheezing; decreased bilateral air entry; no use of accessory muscles for breathing  HEART: Nl S1 S2; no M   ABDOMEN: Soft, Nontender, non distended  EXTREMITIES: no cyanosis; no edema; warm and dry  NERVOUS SYSTEM:  Awake and alert; no focal neuro  deficits    _________________________________________________  LABS:                        8.1    4.99  )-----------( 81       ( 06 Apr 2023 05:42 )             24.8     04-06    140  |  111<H>  |  8   ----------------------------<  90  4.2   |  25  |  0.42<L>    Ca    8.6      06 Apr 2023 05:42  Phos  3.8     04-06  Mg     2.0     04-06    TPro  5.9<L>  /  Alb  2.6<L>  /  TBili  1.7<H>  /  DBili  x   /  AST  42<H>  /  ALT  35  /  AlkPhos  123<H>  04-06    PT/INR - ( 05 Apr 2023 14:30 )   PT: 13.7 sec;   INR: 1.15 ratio         PTT - ( 05 Apr 2023 14:30 )  PTT:30.1 sec    A/P    Problem #1 Rectal ca - ERIC WT s/p liver resection; currently on pallaitive chemo p/w symptomatic anemia  -appreciate GI input  -stable; will arrange for outpt GI  Problem #2 Anemia - likely from GI blood loss; clinically stable; appropriate increase in Hg post PRBC  will continue w/ IV iron as outpt    I have examined the patient at bedside and reviewed patient's data and participated in the management of the patient along with Tas[facility] as well as hemotology/med oncology faculty consisting of Dr. JAD Loco, Dr. JAD Guardado, Dr. Liv Waller, Dr. Bindu Cloud, Dr. Erlin Matthews as well as myself during the daily heme/onc case review. I reviewed pertinent clinical information, PE,  labs as well as A/P as outline above.     Call with questions [phone]    Ray Ho MD

## 2023-04-06 NOTE — DISCHARGE NOTE NURSING/CASE MANAGEMENT/SOCIAL WORK - NSDCPEFALRISK_GEN_ALL_CORE
Dr Andrews has reviewed labs and Will d/w pt at upcoming appt  
Waiting for BMP results.  
For information on Fall & Injury Prevention, visit: [email or web address] OR  [email or web address] OR  https://www.cdc.gov/steadi/patient.html

## 2023-04-06 NOTE — PROGRESS NOTE ADULT - NS ATTEND AMEND GEN_ALL_CORE FT
Patient doing well. No further overt GI bleeding. Hb with appropriate response to PRBCs. HD stable. CTA neg for acute bleed. Outpatient GI f/u.    Total time spent to complete patient's bedside assessment, physical examination, review medical chart including labs & imaging, discuss medical plan of care with housestaff was more than 25 minutes

## 2023-04-06 NOTE — DISCHARGE NOTE PROVIDER - CARE PROVIDER_API CALL
Ray Ho)  Hematology; Internal Medicine; Medical Oncology  [address], [address]  [place] [address]  Phone: [phone]  Fax: [phone]  Follow Up Time:     Mesfin Hutton)  Gastroenterology; Internal Medicine  [address] [place] [address]  Phone: [phone]  Fax: [phone]  Follow Up Time:

## 2023-04-06 NOTE — CONSULT NOTE ADULT - ASSESSMENT
complete note to follow    #VTE Prophylaxis   42 male, with PMHx of rectal cancer (w/ lung nodules, liver mets s/p resection), who was sent by Dr. Ho for Hgb of 6.1 at [facility] today. He complains of 2 weeks of lightheadedness, dyspnea on exertion, and intermittent dark stools(avg of 1 stool per day). He was found to have anemia of 11.4 on 3/22/23 so his chemo has been held since then. Patient denies headache, nausea, vomit, chest pain, shortness of breath, cough, lightheadedness, abdominal pain, diarrhea, constipation, dysuria, hematuria.    #Met Rectal CA  anemia, febrile, hypotensivwe  follows with Oncologist Dr. Ho  currently on FOLFIRI + cetuximab, last given 3/10/23  s/p IR guided liver ablation 3/17/23  sent to ER for symptomatic anemia and he admits melena, denies hematochezia  on admit Hgb=5.9 and s/p 2 units PRBC and now Hgb=8.1  retic 8.9%  T. Bili 1.7  Cr nl  no hemolysis  CTA A/P limited d/t contrast, 5cm rectal mass  Rec's:  -GI consult for w/u r/o bleed and Bx as we need addt'l tissue to send for NGS panel  -Transfusion if Hgb <7.0 or symptomatic  -Check indirect/direct bili  -Daily CBC  further recommendations pending above      #VTE Prophylaxis    Thank you for the referral. Will continue to monitor the patient.  Please call with any questions [phone]  Above reviewed with Attending Dr. Ho  [facility]  [address] [facility], [address], [place], [place]  [phone]  *Note not finalized until signed by Attending Physician     42 male, with PMHx of rectal cancer (w/ lung nodules, liver mets s/p resection), who was sent by Dr. Ho for Hgb of 6.1 at [facility] today. He complains of 2 weeks of lightheadedness, dyspnea on exertion, and intermittent dark stools(avg of 1 stool per day). He was found to have anemia of 11.4 on 3/22/23 so his chemo has been held since then. Patient denies headache, nausea, vomit, chest pain, shortness of breath, cough, lightheadedness, abdominal pain, diarrhea, constipation, dysuria, hematuria.    #Met Rectal CA  anemia, febrile, hypotensive  follows with Oncologist Dr. Ho  currently on FOLFIRI + cetuximab, last given 3/10/23  s/p IR guided liver ablation 3/17/23  sent to ER for symptomatic anemia and he admits melena, denies hematochezia  on admit Hgb=5.9 and s/p 2 units PRBC and now Hgb=8.1  retic 8.9%  T. Bili 1.7  Cr nl  no hemolysis  CTA A/P limited d/t contrast, 5cm rectal mass  Rec's:  -Hgb improved to 8.1 s/p transfusion  -GI consult appreciated, plan for outpt w/u r/o bleed and Bx as we need addt'l tissue to send for NGS panel  -Transfusion if Hgb <7.0 or symptomatic  -Daily CBC  -if Hgb stable and asymptomatic plan for d/c and outpt w/u with GI  f/u with Dr. Ho in 1-3 days      #VTE Prophylaxis    Thank you for the referral. Will continue to monitor the patient.  Please call with any questions [phone]  Above reviewed with Attending Dr. Ho  [facility]  [address] [facility], [address], [place], [place]  [phone]  *Note not finalized until signed by Attending Physician

## 2023-04-06 NOTE — PROGRESS NOTE ADULT - SUBJECTIVE AND OBJECTIVE BOX
GI Progress Note    Patient is a 42y old  Male who presents with a chief complaint of Symptomatic anemia (2023 11:04)    GI was consulted for GIB.    24-HOUR INTERVAL EVENTS: Patient resting in bed, heme/onc at bedside. He endorses nausea otherwise no further BMs since yesterday on admission, tolerating CLD without issue. CTA done however unrevealing for evaluation of active bleed.     MEDICATIONS  (STANDING):  pantoprazole  Injectable 40 milliGRAM(s) IV Push two times a day    MEDICATIONS  (PRN):  acetaminophen     Tablet .. 650 milliGRAM(s) Oral every 6 hours PRN Temp greater or equal to 38C (100.4F), Mild Pain (1 - 3)  aluminum hydroxide/magnesium hydroxide/simethicone Suspension 30 milliLiter(s) Oral every 4 hours PRN Dyspepsia  melatonin 3 milliGRAM(s) Oral at bedtime PRN Insomnia  ondansetron Injectable 4 milliGRAM(s) IV Push every 8 hours PRN Nausea and/or Vomiting  oxyCODONE    IR 5 milliGRAM(s) Oral every 6 hours PRN Severe Pain (7 - 10)  oxyCODONE    IR 2.5 milliGRAM(s) Oral every 6 hours PRN Moderate Pain (4 - 6)    __________________________________________________  REVIEW OF SYSTEMS:  A detailed set of ROS were asked and negative except those outlined in GI HPI above/below.   ________________________________________________  PHYSICAL EXAM    Vital Signs Last 24 Hrs  T(C): 37.3 (2023 05:51), Max: 38.5 (2023 15:51)  T(F): 99.1 (2023 05:51), Max: 101.3 (2023 15:51)  HR: 75 (2023 05:51) (65 - 95)  BP: 86/51 (2023 05:51) (75/49 - 96/68)  BP(mean): 63 (2023 05:51) (54 - 69)  RR: 18 (2023 05:51) (18 - 18)  SpO2: 99% (2023 05:51) (97% - 100%)    Parameters below as of 2023 01:07  Patient On (Oxygen Delivery Method): room air        GEN: NAD  HEENT: EOMI, conjunctivae anicteric, neck supple, moist mucous membranes  PULM: LCTAB, no wheezing, rales, or rhonchi  CV: RRR, no m/r/g  GI: soft, NT, ND; +BS in all four quadrants, no ascites, no Stapleton's sign  MSK: GILLIAM, no edema  NEURO: A&O x 3, no gross deficits  _________________________________________________  LABS:                        8.1    4.99  )-----------( 81       ( 2023 05:42 )             24.8     04-06    140  |  111<H>  |  8   ----------------------------<  90  4.2   |  25  |  0.42<L>    Ca    8.6      2023 05:42  Phos  3.8     04-06  Mg     2.0     04-06    TPro  5.9<L>  /  Alb  2.6<L>  /  TBili  1.7<H>  /  DBili  x   /  AST  42<H>  /  ALT  35  /  AlkPhos  123<H>  04-06    PT/INR - ( 2023 14:30 )   PT: 13.7 sec;   INR: 1.15 ratio         PTT - ( 2023 14:30 )  PTT:30.1 sec  Urinalysis Basic - ( 2023 22:00 )    Color: Yellow / Appearance: Clear / S.010 / pH: x  Gluc: x / Ketone: Negative  / Bili: Negative / Urobili: 1 mg/dL   Blood: x / Protein: 30 mg/dL / Nitrite: Negative   Leuk Esterase: Negative / RBC: 0-2 /HPF / WBC 0-2 /HPF   Sq Epi: x / Non Sq Epi: x / Bacteria: Trace /HPF      CAPILLARY BLOOD GLUCOSE            RADIOLOGY & ADDITIONAL TESTS:          CT ANGIO ABD PELV (W)AW IC   ORDERED BY: NATALIE STAFFORD     PROCEDURE DATE:  2023          INTERPRETATION:  CLINICAL INFORMATION: GI bleed. History of rectal cancer   and liver resection.    COMPARISON: None available.    CONTRAST/COMPLICATIONS:  IV Contrast: Omnipaque 350  90 cc administered   10 cc discarded  Oral Contrast: NONE  Complications: None reported at time of study completion    PROCEDURE:  CT of the Abdomen and Pelvis was performed.  Precontrast, Arterial and Delayed phases were performed.  Sagittal and coronal reformats were performed.    FINDINGS:  LOWER CHEST: Within normal limits.    LIVER: Right hepatectomy. Irregular branching hypoattenuation throughout   the left hepatic lobe  BILE DUCTS: Normal caliber.  GALLBLADDER: Within normal limits.  SPLEEN: Enlarged.  PANCREAS: Within normal limits.  ADRENALS: Within normal limits.  KIDNEYS/URETERS: Within normal limits.    BLADDER: Within normal limits.  REPRODUCTIVE ORGANS: Prostate within normal limits.    BOWEL: Pre-existing contrast throughout the colon limits evaluation for   active bleeding. No active bleed identified. No bowel obstruction. Rectal   mass spanning approximately 5 cm in length.  PERITONEUM: Small volume pelvic free fluid. Mildmesenteric edema,   nonspecific.  VESSELS: Probable cavernous transformation of the portal vein.  RETROPERITONEUM/LYMPH NODES: Numerous subcentimeter short axis   retroperitoneal lymph nodes.  ABDOMINAL WALL: Within normal limits.  BONES: Within normal limits.    IMPRESSION:  1.  Pre-existing contrast that is present throughout the colon markedly   limits evaluation for active bleeding. No active bleed identified.  2.  Rectal mass.  3.  Right hepatectomy. Irregular branching hypoattenuation throughout the   left hepatic lobe is indeterminate without prior exams available for   comparison. Differential is broad and includes treated necrotic   metastases or parenchymal necrosis/infarction, bilomas, and abscess.

## 2023-04-06 NOTE — CONSULT NOTE ADULT - SUBJECTIVE AND OBJECTIVE BOX
MEDICATIONS  (STANDING):  pantoprazole  Injectable 40 milliGRAM(s) IV Push two times a day    MEDICATIONS  (PRN):  acetaminophen     Tablet .. 650 milliGRAM(s) Oral every 6 hours PRN Temp greater or equal to 38C (100.4F), Mild Pain (1 - 3)  aluminum hydroxide/magnesium hydroxide/simethicone Suspension 30 milliLiter(s) Oral every 4 hours PRN Dyspepsia  melatonin 3 milliGRAM(s) Oral at bedtime PRN Insomnia  ondansetron Injectable 4 milliGRAM(s) IV Push every 8 hours PRN Nausea and/or Vomiting  oxyCODONE    IR 5 milliGRAM(s) Oral every 6 hours PRN Severe Pain (7 - 10)  oxyCODONE    IR 2.5 milliGRAM(s) Oral every 6 hours PRN Moderate Pain (4 - 6)    CAPILLARY BLOOD GLUCOSE        I&O's Summary      PHYSICAL EXAM:  Vital Signs Last 24 Hrs  T(C): 37.3 (2023 05:51), Max: 38.5 (2023 15:51)  T(F): 99.1 (2023 05:51), Max: 101.3 (2023 15:51)  HR: 75 (2023 05:51) (65 - 95)  BP: 86/51 (2023 05:51) (75/49 - 96/68)  BP(mean): 63 (2023 05:51) (54 - 69)  RR: 18 (2023 05:51) (18 - 18)  SpO2: 99% (2023 05:51) (97% - 100%)    Parameters below as of 2023 01:07  Patient On (Oxygen Delivery Method): room air          LABS:                        8.1    4.99  )-----------( 81       ( 2023 05:42 )             24.8     04-06    140  |  111<H>  |  8   ----------------------------<  90  4.2   |  25  |  0.42<L>    Ca    8.6      2023 05:42  Phos  3.8     04-06  Mg     2.0     04-06    TPro  5.9<L>  /  Alb  2.6<L>  /  TBili  1.7<H>  /  DBili  x   /  AST  42<H>  /  ALT  35  /  AlkPhos  123<H>  04-06    PT/INR - ( 2023 14:30 )   PT: 13.7 sec;   INR: 1.15 ratio         PTT - ( 2023 14:30 )  PTT:30.1 sec      Urinalysis Basic - ( 2023 22:00 )    Color: Yellow / Appearance: Clear / S.010 / pH: x  Gluc: x / Ketone: Negative  / Bili: Negative / Urobili: 1 mg/dL   Blood: x / Protein: 30 mg/dL / Nitrite: Negative   Leuk Esterase: Negative / RBC: 0-2 /HPF / WBC 0-2 /HPF   Sq Epi: x / Non Sq Epi: x / Bacteria: Trace /HPF            RADIOLOGY & ADDITIONAL TESTS:       Reason for Admission: Symptomatic anemia  History of Present Illness:   Patient is a 42 male, with PMHx of rectal cancer (w/ lung nodules, liver mets s/p resection), who was sent by Dr. Ho for Hgb of 6.1 at [facility] today. He complains of 2 weeks of lightheadedness, dyspnea on exertion, and intermittent dark stools(avg of 1 stool per day). He was found to have anemia of 11.4 on 3/22/23 so his chemo has been held since then. Patient denies headache, nausea, vomit, chest pain, shortness of breath, cough, lightheadedness, abdominal pain, diarrhea, constipation, dysuria, hematuria.      REVIEW OF SYSTEMS:    CONSTITUTIONAL: No fever, no loss of appetite. no chills, no weight loss, +fatigue  EYES: no acute visual disturbances  NECK: No pain or stiffness  RESPIRATORY: No cough; + shortness of breath on exertion  CARDIOVASCULAR: No chest pain, + palpitations on exertion  GASTROINTESTINAL: No pain. No nausea or vomiting; No diarrhea   NEUROLOGICAL: +dizziness on exertion, No headache or numbness, no tremors  MUSCULOSKELETAL: No joint pain, no muscle pain  GENITOURINARY: no dysuria, no frequency, no hesitancy  PSYCHIATRY: no depression, no anxiety  ALL OTHER  ROS negative        Allergies and Intolerances:        Allergies:  	No Known Allergies:     Home Medications:   * Incomplete Medication History as of 2023 17:02 documented in Structured Notes    Patient History:    Past Medical, Past Surgical, and Family History:  PAST MEDICAL HISTORY:  Rectal adenocarcinoma.     PAST SURGICAL HISTORY:  History of resection of liver.     Social History:  · Substance use	No     Tobacco Screening:  · Core Measure Site	Yes  · Has the patient used tobacco in the past 30 days?	No    Risk Assessment:    Present on Admission:  Deep Venous Thrombosis	no  Pulmonary Embolus	no        MEDICATIONS  (STANDING):  pantoprazole  Injectable 40 milliGRAM(s) IV Push two times a day    MEDICATIONS  (PRN):  acetaminophen     Tablet .. 650 milliGRAM(s) Oral every 6 hours PRN Temp greater or equal to 38C (100.4F), Mild Pain (1 - 3)  aluminum hydroxide/magnesium hydroxide/simethicone Suspension 30 milliLiter(s) Oral every 4 hours PRN Dyspepsia  melatonin 3 milliGRAM(s) Oral at bedtime PRN Insomnia  ondansetron Injectable 4 milliGRAM(s) IV Push every 8 hours PRN Nausea and/or Vomiting  oxyCODONE    IR 5 milliGRAM(s) Oral every 6 hours PRN Severe Pain (7 - 10)  oxyCODONE    IR 2.5 milliGRAM(s) Oral every 6 hours PRN Moderate Pain (4 - 6)    CAPILLARY BLOOD GLUCOSE        I&O's Summary      PHYSICAL EXAM:  Vital Signs Last 24 Hrs  T(C): 37.3 (2023 05:51), Max: 38.5 (2023 15:51)  T(F): 99.1 (2023 05:51), Max: 101.3 (2023 15:51)  HR: 75 (2023 05:51) (65 - 95)  BP: 86/51 (2023 05:51) (75/49 - 96/68)  BP(mean): 63 (2023 05:51) (54 - 69)  RR: 18 (2023 05:51) (18 - 18)  SpO2: 99% (2023 05:51) (97% - 100%)    Parameters below as of 2023 01:07  Patient On (Oxygen Delivery Method): room air    GEN: NAD; A and O x 3  LUNGS: CTA B/L  HEART: S1 S2  ABDOMEN: soft, non-tender, non-distended, + BS  EXTREMITIES: no edema  NERVOUS SYSTEM:  Awake and alert; no focal neuro deficits        LABS:                        8.1    4.99  )-----------( 81       ( 2023 05:42 )             24.8     04-06    140  |  111<H>  |  8   ----------------------------<  90  4.2   |  25  |  0.42<L>    Ca    8.6      2023 05:42  Phos  3.8     04-06  Mg     2.0     04-06    TPro  5.9<L>  /  Alb  2.6<L>  /  TBili  1.7<H>  /  DBili  x   /  AST  42<H>  /  ALT  35  /  AlkPhos  123<H>  04-06    PT/INR - ( 2023 14:30 )   PT: 13.7 sec;   INR: 1.15 ratio         PTT - ( 2023 14:30 )  PTT:30.1 sec      Urinalysis Basic - ( 2023 22:00 )    Color: Yellow / Appearance: Clear / S.010 / pH: x  Gluc: x / Ketone: Negative  / Bili: Negative / Urobili: 1 mg/dL   Blood: x / Protein: 30 mg/dL / Nitrite: Negative   Leuk Esterase: Negative / RBC: 0-2 /HPF / WBC 0-2 /HPF   Sq Epi: x / Non Sq Epi: x / Bacteria: Trace /HPF            RADIOLOGY & ADDITIONAL TESTS:    < from: CT Angio Abdomen and Pelvis w/ IV Cont (23 @ 20:55) >  ACC: [id number] EXAM:  CT ANGIO ABD PELV (W)AW IC   ORDERED BY: NATALIE STAFFORD     PROCEDURE DATE:  2023          INTERPRETATION:  CLINICAL INFORMATION: GI bleed. History of rectal cancer   and liver resection.    COMPARISON: None available.    CONTRAST/COMPLICATIONS:  IV Contrast: Omnipaque 350  90 cc administered   10 cc discarded  Oral Contrast: NONE  Complications: None reported at time of study completion    PROCEDURE:  CT of the Abdomen and Pelvis was performed.  Precontrast, Arterial and Delayed phases were performed.  Sagittal and coronal reformats were performed.    FINDINGS:  LOWER CHEST: Within normal limits.    LIVER: Right hepatectomy. Irregular branching hypoattenuation throughout   the left hepatic lobe  BILE DUCTS: Normal caliber.  GALLBLADDER: Within normal limits.  SPLEEN: Enlarged.  PANCREAS: Within normal limits.  ADRENALS: Within normal limits.  KIDNEYS/URETERS: Within normal limits.    BLADDER: Within normal limits.  REPRODUCTIVE ORGANS: Prostate within normal limits.    BOWEL: Pre-existing contrast throughout the colon limits evaluation for   active bleeding. No active bleed identified. No bowel obstruction. Rectal   mass spanning approximately 5 cm in length.  PERITONEUM: Small volume pelvic free fluid. Mildmesenteric edema,   nonspecific.  VESSELS: Probable cavernous transformation of the portal vein.  RETROPERITONEUM/LYMPH NODES: Numerous subcentimeter short axis   retroperitoneal lymph nodes.  ABDOMINAL WALL: Within normal limits.  BONES: Within normal limits.    IMPRESSION:  1.  Pre-existing contrast that is present throughout the colon markedly   limits evaluation for active bleeding. No active bleed identified.  2.  Rectal mass.  3.  Right hepatectomy. Irregular branching hypoattenuation throughout the   left hepatic lobe is indeterminate without prior exams available for   comparison. Differential is broad and includes treated necrotic   metastases or parenchymal necrosis/infarction, bilomas, and abscess.    < end of copied text >    Reticulocyte Count (23 @ 14:30)   Reticulocyte Percent: 8.9 %  Absolute Reticulocytes: 176.8 K/uL

## 2023-04-06 NOTE — DISCHARGE NOTE PROVIDER - NSDCMRMEDTOKEN_GEN_ALL_CORE_FT
docusate sodium 100 mg oral capsule: 1 cap(s) orally 2 times a day  furosemide 20 mg oral tablet: 1 tab(s) orally once a day  omeprazole 20 mg oral delayed release capsule: 1 cap(s) orally once a day  ondansetron 8 mg oral tablet: 1 tab(s) orally every 8 hours as needed for  nausea  oxycodone-acetaminophen 5 mg-325 mg oral tablet: 1 tab(s) orally every 6 hours as needed for  pain  spironolactone 25 mg oral tablet: 1 tab(s) orally once a day

## 2023-04-06 NOTE — PROGRESS NOTE ADULT - ASSESSMENT
Patient is a 42M with a PMHx of rectal carcinoma (with liver mets s/p resection), who presented to the ED for anemia. GI was consulted for GIB.    Patient states he's had dark brown "sticky" stools x 2 weeks (not on a daily occurrence), last BM yesterday. He was instructed to go to the ED for anemia, also reports dark stools but no bright red blood, endorses weakness and sob with ambulation, did not receive chemo x 2 weeks due to anemia. MARY deferred at time of eval given patient had the urge to have a BM, agreed to take a picture and show it to provider tomorrow. Denies n/v,dyspepsia. He has never had prior blood transfusions.     He follows Dr. Ray Ho at [facility], last seen 3/29. He complained of abdominal pain 2 years ago, had EGD/COLO (5/21/21),  notable for ulcerated and fungating mass in rectum at 4cm from anal verge c/w moderately differentiated adenocarcinoma. Per chart review:  10/3/22: s/p liver trisegmentectomy resection, path showed multiple foci of metastatic moderately-differentiated adenocarcinoma c/w colorectal primary.  1/25/23 Hgb 8.8, plt 115.   3/11/23: Hgb 10  3/17/23: s/p IR guided liver ablation at [facility]  3/29/23: Hgb 8.4, MCV 93.2, plt 95    In the ED, labs notable for Hgb 5.9, MCV 95.4, INR 1.15, BUN 12, Cr 0.5, TB 0.4, , AST 49, ALT 40. Ordered for 2u PRBCs.     #GIB  #Anemia  #Rectal carcinoma  #s/p liver trisegmentectomy resection  Suspect chronic bleeding from known rectal malignancy. Alk Phos and ALT slightly elevated most likely iso previous chemotherapy.   Given HD stability and high suspicion of GIB source, conservative management at this time. No indication for GI intervention.  CT Angio A/P: right hepatectomy, irregular branching hypoattenuation throughout left hepatic lobe, no active bleed, rectal mass spanning 5cm in length, cavernous transformation of portal vein, pre-existnig contrast present throughout colon limits eval of active bleed.  4/6: Hgb 8.1 (post-transfusion, 2u PRBC), MCV 90.5, plt 81, BUN 8, Cr 0.42, TB 1.7, , AST 42, ALT 35. QMA requesting additional tissue biopsy for further testing.      	- s/p CT Angio, unrevealing for active bleed  	- Conservative management  	- CLD   	- Maintain active T&S, 2 large bore peripheral IVs, transfuse for goal Hgb >7-8 and goal plt >50  	- Trend H/H and PT/INR  	- HD stability per primary team  	- IV Protonix 40mg BID  	- GI to re-evaluate colonoscopy for obtaining additional biopsy, inpatient vs outpatient, TBD    This note and its recommendations herein are preliminary until such time as cosigned by an attending.     Patient is a 42M with a PMHx of rectal carcinoma (with liver mets s/p resection), who presented to the ED for anemia. GI was consulted for GIB.    Patient states he's had dark brown "sticky" stools x 2 weeks (not on a daily occurrence), last BM yesterday. He was instructed to go to the ED for anemia, also reports dark stools but no bright red blood, endorses weakness and sob with ambulation, did not receive chemo x 2 weeks due to anemia. MARY deferred at time of eval given patient had the urge to have a BM, agreed to take a picture and show it to provider tomorrow. Denies n/v,dyspepsia. He has never had prior blood transfusions.     He follows Dr. Ray Ho at [facility], last seen 3/29. He complained of abdominal pain 2 years ago, had EGD/COLO (5/21/21),  notable for ulcerated and fungating mass in rectum at 4cm from anal verge c/w moderately differentiated adenocarcinoma. Per chart review:  10/3/22: s/p liver trisegmentectomy resection, path showed multiple foci of metastatic moderately-differentiated adenocarcinoma c/w colorectal primary.  1/25/23 Hgb 8.8, plt 115.   3/11/23: Hgb 10  3/17/23: s/p IR guided liver ablation at [facility]  3/29/23: Hgb 8.4, MCV 93.2, plt 95    In the ED, labs notable for Hgb 5.9, MCV 95.4, INR 1.15, BUN 12, Cr 0.5, TB 0.4, , AST 49, ALT 40. Ordered for 2u PRBCs.     #GIB  #Anemia  #Rectal carcinoma  #s/p liver trisegmentectomy resection  Suspect chronic bleeding from known rectal malignancy. Alk Phos and ALT slightly elevated most likely iso previous chemotherapy.   Given HD stability and high suspicion of GIB source, conservative management at this time. No indication for GI intervention.  CT Angio A/P: right hepatectomy, irregular branching hypoattenuation throughout left hepatic lobe, no active bleed, rectal mass spanning 5cm in length, cavernous transformation of portal vein, pre-existnig contrast present throughout colon limits eval of active bleed.  4/6: Hgb 8.1 (post-transfusion, 2u PRBC), MCV 90.5, plt 81, BUN 8, Cr 0.42, TB 1.7, , AST 42, ALT 35. QMA requesting additional tissue biopsy for further testing.      	- s/p CT Angio, unrevealing for active bleed  	- Conservative management  	- CLD   	- Maintain active T&S, 2 large bore peripheral IVs, transfuse for goal Hgb >7-8 and goal plt >50  	- Trend H/H and PT/INR  	- HD stability per primary team  	- IV Protonix 40mg BID  	- Given HD stability and scheduling issues, recommend outpatient colonoscopy for additional tissue biopsy. Primary team & heme/onc aware.    This note and its recommendations herein are preliminary until such time as cosigned by an attending.

## 2023-04-06 NOTE — DISCHARGE NOTE PROVIDER - ATTENDING DISCHARGE PHYSICAL EXAMINATION:
#adenocarcinoma rectum  #GIB  #symptomatic anemia    Gen: NAD  Neuro: alert, answering qs appropriately, moves all extremities  HEENT: anicteric, moist oral mucosa  Neck: supple, no JVD elevation  Cards: RRR  Pulm: good inspiratory effort, CTAB  Abd: soft, NT/ND, BS+  Ext: no edema  Skin: warm, dry

## 2023-04-06 NOTE — DISCHARGE NOTE PROVIDER - NSDCCPCAREPLAN_GEN_ALL_CORE_FT
PRINCIPAL DISCHARGE DIAGNOSIS  Diagnosis: Symptomatic anemia  Assessment and Plan of Treatment: You were Found to have Hemoglobin of 5.9   You received 2 units of blood and your  Hg  improved to 8.1  You were seen by Gastroenterology team   As per Gastroenterology, the source of anemia is likely bleeding from rectal mass   Recommended outpatient follow up with hem/oncology Dr Ho and GI to re-evaluate with colonoscopy for obtaining additional biopsy on outpatient bases   Please follow up with your gastroenterologyst and oncologist within one week         SECONDARY DISCHARGE DIAGNOSES  Diagnosis: Rectal adenocarcinoma  Assessment and Plan of Treatment: plan as above

## 2023-04-10 PROBLEM — C20 MALIGNANT NEOPLASM OF RECTUM: Chronic | Status: ACTIVE | Noted: 2023-04-05

## 2023-04-11 PROBLEM — Z00.00 ENCOUNTER FOR PREVENTIVE HEALTH EXAMINATION: Status: ACTIVE | Noted: 2023-04-11

## 2023-04-11 LAB
CULTURE RESULTS: SIGNIFICANT CHANGE UP
CULTURE RESULTS: SIGNIFICANT CHANGE UP
SPECIMEN SOURCE: SIGNIFICANT CHANGE UP
SPECIMEN SOURCE: SIGNIFICANT CHANGE UP

## 2023-04-12 ENCOUNTER — APPOINTMENT (OUTPATIENT)
Dept: GASTROENTEROLOGY | Facility: CLINIC | Age: 43
End: 2023-04-12
Payer: MEDICAID

## 2023-04-12 VITALS — BODY MASS INDEX: 22.51 KG/M2 | HEIGHT: 69 IN | WEIGHT: 152 LBS

## 2023-04-12 DIAGNOSIS — R19.5 OTHER FECAL ABNORMALITIES: ICD-10-CM

## 2023-04-12 DIAGNOSIS — D64.9 ANEMIA, UNSPECIFIED: ICD-10-CM

## 2023-04-12 DIAGNOSIS — Z85.048 PERSONAL HISTORY OF OTHER MALIGNANT NEOPLASM OF RECTUM, RECTOSIGMOID JUNCTION, AND ANUS: ICD-10-CM

## 2023-04-12 DIAGNOSIS — C78.7 MALIGNANT NEOPLASM OF RECTUM: ICD-10-CM

## 2023-04-12 DIAGNOSIS — C20 MALIGNANT NEOPLASM OF RECTUM: ICD-10-CM

## 2023-04-12 PROCEDURE — 99204 OFFICE O/P NEW MOD 45 MIN: CPT

## 2023-04-12 PROCEDURE — 99214 OFFICE O/P EST MOD 30 MIN: CPT

## 2023-04-12 RX ORDER — POLYETHYLENE GLYCOL-3350 AND ELECTROLYTES WITH FLAVOR PACK 240; 5.84; 2.98; 6.72; 22.72 G/278.26G; G/278.26G; G/278.26G; G/278.26G; G/278.26G
240 POWDER, FOR SOLUTION ORAL
Qty: 1 | Refills: 0 | Status: ACTIVE | COMMUNITY
Start: 2023-04-12 | End: 1900-01-01

## 2023-04-12 NOTE — HISTORY OF PRESENT ILLNESS
[FreeTextEntry1] : 42M with pmhx of rectal cancer with liver mets on chemotherapy and liver ablation presenting for evaluation of anemia, dark stools, recently admitted at [facility]. Pt presented to [facility] this month with symptomatic anemia and dark stools, had PRBCs and responded appropriately, dced home. Pt reports prior EGD/colon when diagnosed with rectal cancer which showed rectal mass and H. pylori (not treated). Pt states since hospitalization, has been doing well. No further dark stools. No abd pain, n/v/d/c, melena, hematochezia, fever/chills, or other issues.\par \par PMHx: Above.\par Medications: Protonix.\par Allergies: NKDA.\par Surgical Hx: Liver ablation.\par SH: Former tobacco use, denies etoh or drug use.\par FH: Denies fhx of GI disorder.

## 2023-04-12 NOTE — PHYSICAL EXAM

## 2023-04-12 NOTE — ASSESSMENT
[FreeTextEntry1] : 42M with pmhx of rectal cancer with liver mets on chemotherapy and liver ablation presenting for evaluation of anemia, dark stools, recently admitted at [facility]. Pt presented to [facility] this month with symptomatic anemia and dark stools, had PRBCs and responded appropriately, dced home. Pt reports prior EGD/colon when diagnosed with rectal cancer which showed rectal mass and H. pylori (not treated). Pt states since hospitalization, has been doing well. No further dark stools. GI bleed 2/2 known rectal mass but could have been upper source as well given untreated H. pylori.\par - Plan for EGD and colonoscopy (repeat rectal ca biopsies planned for targeted therapy).\par - Golytely prep instructions given.\par - F/u as planned with Dr. Ho med onc.

## 2023-04-17 ENCOUNTER — TRANSCRIPTION ENCOUNTER (OUTPATIENT)
Age: 43
End: 2023-04-17

## 2023-04-25 ENCOUNTER — TRANSCRIPTION ENCOUNTER (OUTPATIENT)
Age: 43
End: 2023-04-25

## 2023-04-25 ENCOUNTER — APPOINTMENT (OUTPATIENT)
Dept: GASTROENTEROLOGY | Facility: HOSPITAL | Age: 43
End: 2023-04-25

## 2023-04-25 ENCOUNTER — OUTPATIENT (OUTPATIENT)
Dept: OUTPATIENT SERVICES | Facility: HOSPITAL | Age: 43
LOS: 1 days | End: 2023-04-25
Payer: MEDICAID

## 2023-04-25 ENCOUNTER — RESULT REVIEW (OUTPATIENT)
Age: 43
End: 2023-04-25

## 2023-04-25 VITALS
HEART RATE: 88 BPM | WEIGHT: 149.91 LBS | RESPIRATION RATE: 15 BRPM | DIASTOLIC BLOOD PRESSURE: 63 MMHG | TEMPERATURE: 99 F | SYSTOLIC BLOOD PRESSURE: 91 MMHG | OXYGEN SATURATION: 100 % | HEIGHT: 69 IN

## 2023-04-25 VITALS
RESPIRATION RATE: 16 BRPM | DIASTOLIC BLOOD PRESSURE: 65 MMHG | HEART RATE: 86 BPM | OXYGEN SATURATION: 100 % | SYSTOLIC BLOOD PRESSURE: 111 MMHG

## 2023-04-25 DIAGNOSIS — K25.9 GASTRIC ULCER, UNSPECIFIED AS ACUTE OR CHRONIC, W/OUT HEMORRHAGE OR PERFORATION: ICD-10-CM

## 2023-04-25 DIAGNOSIS — D64.9 ANEMIA, UNSPECIFIED: ICD-10-CM

## 2023-04-25 DIAGNOSIS — Z90.49 ACQUIRED ABSENCE OF OTHER SPECIFIED PARTS OF DIGESTIVE TRACT: Chronic | ICD-10-CM

## 2023-04-25 DIAGNOSIS — I85.00 ESOPHAGEAL VARICES W/OUT BLEEDING: ICD-10-CM

## 2023-04-25 PROCEDURE — 88305 TISSUE EXAM BY PATHOLOGIST: CPT | Mod: 26

## 2023-04-25 PROCEDURE — 43244 EGD VARICES LIGATION: CPT

## 2023-04-25 PROCEDURE — 43270 EGD LESION ABLATION: CPT

## 2023-04-25 PROCEDURE — 45380 COLONOSCOPY AND BIOPSY: CPT

## 2023-04-25 PROCEDURE — C1889: CPT

## 2023-04-25 PROCEDURE — 88305 TISSUE EXAM BY PATHOLOGIST: CPT

## 2023-04-25 PROCEDURE — 88312 SPECIAL STAINS GROUP 1: CPT

## 2023-04-25 PROCEDURE — 88312 SPECIAL STAINS GROUP 1: CPT | Mod: 26

## 2023-04-25 DEVICE — SPEEDBAND SPRVW 7: Type: IMPLANTABLE DEVICE | Status: FUNCTIONAL

## 2023-04-25 RX ORDER — PANTOPRAZOLE SODIUM 20 MG/1
40 TABLET, DELAYED RELEASE ORAL ONCE
Refills: 0 | Status: COMPLETED | OUTPATIENT
Start: 2023-04-25 | End: 2023-04-25

## 2023-04-25 RX ORDER — OMEPRAZOLE 40 MG/1
40 CAPSULE, DELAYED RELEASE ORAL TWICE DAILY
Qty: 120 | Refills: 2 | Status: ACTIVE | COMMUNITY
Start: 2023-04-25 | End: 1900-01-01

## 2023-04-25 RX ORDER — SODIUM CHLORIDE 9 MG/ML
500 INJECTION INTRAMUSCULAR; INTRAVENOUS; SUBCUTANEOUS
Refills: 0 | Status: COMPLETED | OUTPATIENT
Start: 2023-04-25 | End: 2023-04-25

## 2023-04-25 RX ADMIN — PANTOPRAZOLE SODIUM 40 MILLIGRAM(S): 20 TABLET, DELAYED RELEASE ORAL at 16:38

## 2023-04-25 RX ADMIN — SODIUM CHLORIDE 75 MILLILITER(S): 9 INJECTION INTRAMUSCULAR; INTRAVENOUS; SUBCUTANEOUS at 15:48

## 2023-04-25 NOTE — ASU DISCHARGE PLAN (ADULT/PEDIATRIC) - PATIENT EDUCATION MATERIALS PROVIED
Message   Recorded as Task   Date: 05/22/2017 09:04 AM, Created By: Keiko Braxton   Task Name: Call Back   Assigned To: Keiko Braxton   Regarding Patient: Debbie Scherer, Status: Active   [facility] - 22 May 2017 9:04 AM     TASK CREATED    Patient called stating that she is still experiencing pain in her leg that feels like a burning sensation  This is the pain that she had called about on Friday, 5/19/17  She said that she spoke with the [employer] rep, Lynn, who had tried adjusting the settings and even turned off the stimulator and the patient was still experiencing these symptoms  Patient was prescribed stronger pain medication by her PCP on friday as well (we would have prescribed, but she couldn't get a ride down here)  Patient denies any redness/swelling of her leg and has been mobile  Offered patient a sooner appointment with a PA since patient isn't scheduled to come in until June 1st   Patient said that getting a ride is very difficult and that she will "give it a little more time" before she becomes concerned  Advised her to please call the office with any changes in symptoms or worsening pain  She verbalized understanding  Active Problems    1  Abdominal pain (789 00) (R10 9)   2  Abrasion of finger (915 0) (S60 419A)   3  Acid reflux disease (530 81) (K21 9)   4  Advance directive discussed with patient (V65 49) (Z71 89)   5  Anxiety (300 00) (F41 9)   6  Back pain (724 5) (M54 9)   7  Bacteriuria (791 9) (R82 71)   8  BRBPR (bright red blood per rectum) (569 3) (K62 5)   9  Carpal tunnel syndrome, unspecified laterality (354 0) (G56 00)   10  Cellulitis, leg (682 6) (L03 119)   11  Chronic coughing (786 2) (R05)   12  Chronic low back pain (724 2,338 29) (M54 5,G89 29)   13  Chronic pain syndrome (338 4) (G89 4)   14  Degenerative joint disease of right lower extremity (715 98) (M19 90)   15  Encounter for long-term use of opiate analgesic (V58 69) (Z79 891)   16   Encounter for screening mammogram for malignant neoplasm of breast (V76 12)    (Z12 31)   17  Failed back syndrome of lumbar spine (722 83) (M96 1)   18  Fatigue (780 79) (R53 83)   19  History of Fibrocystic breast disease, unspecified laterality (610 1) (N60 19)   20  Greater trochanteric bursitis of left hip (726 5) (M70 62)   21  Head trauma (959 01) (S09 90XA)   22  Hematoma of abdominal wall (922 2) (S30 1XXA)   23  Hemorrhoids (455 6) (K64 9)   24  History of recent fall (V15 88) (Z91 81)   25  Hyperlipidemia (272 4) (E78 5)   26  Hypertension (401 9) (I10)   27  Irritable bowel syndrome (564 1) (K58 9)   28  Lumbar degenerative disc disease (722 52) (M51 36)   29  Lumbar radiculopathy, chronic (724 4) (M54 16)   30  Lumbar spondylosis (721 3) (M47 816)   31  Neuralgia (729 2) (M79 2)   32  Pain of lower extremity, unspecified laterality (729 5) (M79 606)   33  Pain, joint, shoulder (719 41) (M25 519)   34  Paresthesias (782 0) (R20 2)   35  Peripheral neuropathy (356 9) (G62 9)   36  Post laminectomy syndrome (722 80) (M96 1)   37  Pre-procedural examination (V72 84) (Z01 818)   38  Pre-procedural laboratory examination (V72 63) (Z01 812)   39  Refused pneumococcal vaccination (V64 06) (Z28 21)   40  Sacroiliitis (720 2) (M46 1)   41  Sinusitis (473 9) (J32 9)   42  Status post insertion of spinal cord stimulator (V45 89) (Z98 890)   43  Status post surgery (V45 89) (Z98 890)   44  Thoracic back pain (724 1) (M54 6)   45  Thyroid dysfunction (246 9) (E07 9)   46  Uncomplicated opioid dependence (304 00) (F11 20)   47  UTI (urinary tract infection) (599 0) (N39 0)   48  Vitamin D deficiency (268 9) (E55 9)    Current Meds   1  Allegra Allergy 180 MG Oral Tablet (Fexofenadine HCl); take 1 tablet daily as needed   Recorded   2  Amitriptyline HCl - 50 MG Oral Tablet; TAKE 1 TABLET AT BEDTIME; Therapy: 82OJD9560 to (Evaluate:29Thc5905)  Requested for: 24Apr2017; Last   Rx:24Apr2017 Ordered   3   Azelastine HCl - 0 1 % Nasal Solution; USE 1 SPRAY IN EACH NOSTRIL TWICE DAILY; Therapy: 78BJG3989 to (Last Rx:35Dyd9954)  Requested for: 79Jfe2631 Ordered   4  Clindamycin HCl - 300 MG Oral Capsule; TAKE 2 CAPSULE Every 8 hours Please start   after you arrive home from surgery; Therapy: 59AFK4379 to (Miguel Angel Gordon)  Requested for: 95ACE6980; Last   Rx:50Dih5216; Status: ACTIVE - Retrospective By Protocol Authorization Ordered   5  Diclofenac Sodium 1 % Transdermal Gel (Voltaren); APPLY QID; Therapy: 16HZW6658 to (Evaluate:17May2017)  Requested for: 42ABS0955; Last   Rx:18Nov2016 Ordered   6  EpiPen 2-Michel 0 3 MG/0 3ML Injection Solution Auto-injector; use as directed; Therapy: 89DHP4039 to (Last EE:12CTM1256)  Requested for: 54ZRA4347 Ordered   7  Gabapentin 300 MG Oral Capsule; TAKE 1 CAPSULE 3 times daily; Therapy: 64IGN9430 to (Evaluate:13Apr2017)  Requested for: 32Sqq1659; Last   Rx:16Cuo9530 Ordered   8  HydrOXYzine HCl - 10 MG Oral Tablet; TAKE 2 TABLETS AT BEDTIME; Therapy: 00MXZ8433 to (Evaluate:38Cjs4630)  Requested for: 21Mar2017; Last   Rx:21Mar2017 Ordered   9  Losartan Potassium 50 MG Oral Tablet; TAKE 1 TABLET DAILY; Therapy: 88TUI5385 to (03 17 74 30 53)  Requested for: 26ICE6169; Last   FS:88ATY6389 Ordered   10  Omeprazole 40 MG Oral Capsule Delayed Release; Take 1 daily; Therapy: 22UUT4009 to (Guillermo Reaves)  Requested for: 24QRL5354; Last    Rx:30Jan2017 Ordered   11  OxyCODONE HCl - 5 MG Oral Tablet; Take 1 tablet every 8 hours for severe pain; Therapy: 90GZE7291 to (Last Rx:79Qxe7477) Ordered   12  Simvastatin 10 MG Oral Tablet; take one tablet by mouth every other day; Therapy: 98DXS0294 to (Evaluate:18Jun2017)  Requested for: 80OWU6039; Last    YY:46LJG4089 Ordered   13  Sulfamethoxazole-Trimethoprim 800-160 MG Oral Tablet (Bactrim DS); TAKE 1 TABLET    TWICE DAILY; Therapy: 61JEY0850 to (Evaluate:55Hxk9468)  Requested for: 96CST7093; Last    SB:30IOM9068 Ordered   14  TiZANidine HCl - 4 MG Oral Tablet; TAKE 1/2 TO 1 TABLET 3 TIMES DAILY AS NEEDED; Therapy: 56BWP7081 to (RNDLUOAQ:13ALS7094)  Requested for: 24Apr2017; Last    Rx:24Apr2017 Ordered   15  TraMADol HCl - 50 MG Oral Tablet; TAKE 1 TABLET EVERY 6 HOURS AS NEEDED FOR    PAIN;    Therapy: 89IJN2847 to (Evaluate:25May2017); Last Rx:60Her2221; Status: ACTIVE -    Retrospective By Protocol Authorization Ordered   16  TraMADol HCl - 50 MG Oral Tablet; TAKE 1 TABLET Every 8 hours PRN; Therapy: 43OSR8864 to (Evaluate:24May2017); Last Rx:24Apr2017 Ordered    Allergies    1  Codeine Derivatives   2  Iodinated Contrast Media   3  Latex Exam Gloves MISC   4  Penicillins    5  Adhesive Tape   6   Eggs    Signatures   Electronically signed by : Vj Vee RN; May 22 2017  9:04AM EST                       (Author) Provider pre-printed instructions given

## 2023-04-25 NOTE — ASU DISCHARGE PLAN (ADULT/PEDIATRIC) - NS MD DC FALL RISK RISK
For information on Fall & Injury Prevention, visit: [email or web address] OR  [email or web address] OR  https://www.cdc.gov/steadi/patient.html

## 2023-04-27 LAB — SURGICAL PATHOLOGY STUDY: SIGNIFICANT CHANGE UP

## 2023-12-28 ENCOUNTER — APPOINTMENT (OUTPATIENT)
Dept: CT IMAGING | Facility: IMAGING CENTER | Age: 43
End: 2023-12-28
Payer: MEDICAID

## 2023-12-28 ENCOUNTER — OUTPATIENT (OUTPATIENT)
Dept: OUTPATIENT SERVICES | Facility: HOSPITAL | Age: 43
LOS: 1 days | End: 2023-12-28
Payer: MEDICAID

## 2023-12-28 DIAGNOSIS — C20 MALIGNANT NEOPLASM OF RECTUM: ICD-10-CM

## 2023-12-28 DIAGNOSIS — D72.819 DECREASED WHITE BLOOD CELL COUNT, UNSPECIFIED: ICD-10-CM

## 2023-12-28 DIAGNOSIS — Z90.49 ACQUIRED ABSENCE OF OTHER SPECIFIED PARTS OF DIGESTIVE TRACT: Chronic | ICD-10-CM

## 2023-12-28 PROCEDURE — 71260 CT THORAX DX C+: CPT

## 2023-12-28 PROCEDURE — 74177 CT ABD & PELVIS W/CONTRAST: CPT | Mod: 26

## 2023-12-28 PROCEDURE — 71260 CT THORAX DX C+: CPT | Mod: 26

## 2023-12-28 PROCEDURE — 74177 CT ABD & PELVIS W/CONTRAST: CPT

## 2024-04-30 ENCOUNTER — APPOINTMENT (OUTPATIENT)
Dept: CT IMAGING | Facility: IMAGING CENTER | Age: 44
End: 2024-04-30
Payer: MEDICAID

## 2024-04-30 ENCOUNTER — OUTPATIENT (OUTPATIENT)
Dept: OUTPATIENT SERVICES | Facility: HOSPITAL | Age: 44
LOS: 1 days | End: 2024-04-30
Payer: MEDICAID

## 2024-04-30 ENCOUNTER — RESULT REVIEW (OUTPATIENT)
Age: 44
End: 2024-04-30

## 2024-04-30 DIAGNOSIS — Z90.49 ACQUIRED ABSENCE OF OTHER SPECIFIED PARTS OF DIGESTIVE TRACT: Chronic | ICD-10-CM

## 2024-04-30 DIAGNOSIS — Z00.8 ENCOUNTER FOR OTHER GENERAL EXAMINATION: ICD-10-CM

## 2024-04-30 PROCEDURE — 71260 CT THORAX DX C+: CPT | Mod: 26

## 2024-04-30 PROCEDURE — 74177 CT ABD & PELVIS W/CONTRAST: CPT

## 2024-04-30 PROCEDURE — 74177 CT ABD & PELVIS W/CONTRAST: CPT | Mod: 26

## 2024-04-30 PROCEDURE — 71260 CT THORAX DX C+: CPT

## 2024-10-23 ENCOUNTER — INPATIENT (INPATIENT)
Facility: HOSPITAL | Age: 44
LOS: 1 days | Discharge: HOME | DRG: 394 | End: 2024-10-25
Attending: HOSPITALIST | Admitting: HOSPITALIST
Payer: MEDICAID

## 2024-10-23 VITALS
HEART RATE: 104 BPM | WEIGHT: 165.35 LBS | RESPIRATION RATE: 17 BRPM | SYSTOLIC BLOOD PRESSURE: 120 MMHG | OXYGEN SATURATION: 100 % | DIASTOLIC BLOOD PRESSURE: 70 MMHG | HEIGHT: 68 IN | TEMPERATURE: 98 F

## 2024-10-23 DIAGNOSIS — E03.9 HYPOTHYROIDISM, UNSPECIFIED: ICD-10-CM

## 2024-10-23 DIAGNOSIS — Z29.9 ENCOUNTER FOR PROPHYLACTIC MEASURES, UNSPECIFIED: ICD-10-CM

## 2024-10-23 DIAGNOSIS — R71.0 PRECIPITOUS DROP IN HEMATOCRIT: ICD-10-CM

## 2024-10-23 DIAGNOSIS — D64.9 ANEMIA, UNSPECIFIED: ICD-10-CM

## 2024-10-23 DIAGNOSIS — Z90.49 ACQUIRED ABSENCE OF OTHER SPECIFIED PARTS OF DIGESTIVE TRACT: Chronic | ICD-10-CM

## 2024-10-23 DIAGNOSIS — C20 MALIGNANT NEOPLASM OF RECTUM: ICD-10-CM

## 2024-10-23 LAB
ALBUMIN SERPL ELPH-MCNC: 3.1 G/DL — LOW (ref 3.5–5)
ALP SERPL-CCNC: 144 U/L — HIGH (ref 40–120)
ALT FLD-CCNC: 33 U/L DA — SIGNIFICANT CHANGE UP (ref 10–60)
ANION GAP SERPL CALC-SCNC: 6 MMOL/L — SIGNIFICANT CHANGE UP (ref 5–17)
ANISOCYTOSIS BLD QL: SLIGHT — SIGNIFICANT CHANGE UP
APTT BLD: 26.7 SEC — SIGNIFICANT CHANGE UP (ref 24.5–35.6)
AST SERPL-CCNC: 40 U/L — SIGNIFICANT CHANGE UP (ref 10–40)
BASOPHILS # BLD AUTO: 0.02 K/UL — SIGNIFICANT CHANGE UP (ref 0–0.2)
BASOPHILS NFR BLD AUTO: 0.3 % — SIGNIFICANT CHANGE UP (ref 0–2)
BILIRUB DIRECT SERPL-MCNC: 0.1 MG/DL — SIGNIFICANT CHANGE UP (ref 0–0.3)
BILIRUB INDIRECT FLD-MCNC: 0.3 MG/DL — SIGNIFICANT CHANGE UP (ref 0.2–1)
BILIRUB SERPL-MCNC: 0.4 MG/DL — SIGNIFICANT CHANGE UP (ref 0.2–1.2)
BLD GP AB SCN SERPL QL: SIGNIFICANT CHANGE UP
BUN SERPL-MCNC: 13 MG/DL — SIGNIFICANT CHANGE UP (ref 7–18)
CALCIUM SERPL-MCNC: 8.5 MG/DL — SIGNIFICANT CHANGE UP (ref 8.4–10.5)
CHLORIDE SERPL-SCNC: 109 MMOL/L — HIGH (ref 96–108)
CO2 SERPL-SCNC: 25 MMOL/L — SIGNIFICANT CHANGE UP (ref 22–31)
CREAT SERPL-MCNC: 0.52 MG/DL — SIGNIFICANT CHANGE UP (ref 0.5–1.3)
EGFR: 128 ML/MIN/1.73M2 — SIGNIFICANT CHANGE UP
EOSINOPHIL # BLD AUTO: 0.05 K/UL — SIGNIFICANT CHANGE UP (ref 0–0.5)
EOSINOPHIL NFR BLD AUTO: 0.8 % — SIGNIFICANT CHANGE UP (ref 0–6)
GLUCOSE BLDC GLUCOMTR-MCNC: 109 MG/DL — HIGH (ref 70–99)
GLUCOSE SERPL-MCNC: 99 MG/DL — SIGNIFICANT CHANGE UP (ref 70–99)
HCT VFR BLD CALC: 16.3 % — CRITICAL LOW (ref 39–50)
HGB BLD-MCNC: 4.7 G/DL — CRITICAL LOW (ref 13–17)
HYPOCHROMIA BLD QL: SIGNIFICANT CHANGE UP
IMM GRANULOCYTES NFR BLD AUTO: 0.3 % — SIGNIFICANT CHANGE UP (ref 0–0.9)
INR BLD: 1.04 RATIO — SIGNIFICANT CHANGE UP (ref 0.85–1.16)
LDH SERPL L TO P-CCNC: 301 U/L — HIGH (ref 120–225)
LG PLATELETS BLD QL AUTO: SLIGHT — SIGNIFICANT CHANGE UP
LYMPHOCYTES # BLD AUTO: 0.87 K/UL — LOW (ref 1–3.3)
LYMPHOCYTES # BLD AUTO: 14.6 % — SIGNIFICANT CHANGE UP (ref 13–44)
MANUAL SMEAR VERIFICATION: SIGNIFICANT CHANGE UP
MCHC RBC-ENTMCNC: 24.1 PG — LOW (ref 27–34)
MCHC RBC-ENTMCNC: 28.8 GM/DL — LOW (ref 32–36)
MCV RBC AUTO: 83.6 FL — SIGNIFICANT CHANGE UP (ref 80–100)
MICROCYTES BLD QL: SLIGHT — SIGNIFICANT CHANGE UP
MONOCYTES # BLD AUTO: 0.65 K/UL — SIGNIFICANT CHANGE UP (ref 0–0.9)
MONOCYTES NFR BLD AUTO: 10.9 % — SIGNIFICANT CHANGE UP (ref 2–14)
NEUTROPHILS # BLD AUTO: 4.34 K/UL — SIGNIFICANT CHANGE UP (ref 1.8–7.4)
NEUTROPHILS NFR BLD AUTO: 73.1 % — SIGNIFICANT CHANGE UP (ref 43–77)
NRBC # BLD: 0 /100 WBCS — SIGNIFICANT CHANGE UP (ref 0–0)
OVALOCYTES BLD QL SMEAR: SLIGHT — SIGNIFICANT CHANGE UP
PLAT MORPH BLD: NORMAL — SIGNIFICANT CHANGE UP
PLATELET # BLD AUTO: 108 K/UL — LOW (ref 150–400)
PLATELET COUNT - ESTIMATE: ABNORMAL
POIKILOCYTOSIS BLD QL AUTO: SLIGHT — SIGNIFICANT CHANGE UP
POLYCHROMASIA BLD QL SMEAR: SLIGHT — SIGNIFICANT CHANGE UP
POTASSIUM SERPL-MCNC: 3.9 MMOL/L — SIGNIFICANT CHANGE UP (ref 3.5–5.3)
POTASSIUM SERPL-SCNC: 3.9 MMOL/L — SIGNIFICANT CHANGE UP (ref 3.5–5.3)
PROT SERPL-MCNC: 6 G/DL — SIGNIFICANT CHANGE UP (ref 6–8.3)
PROTHROM AB SERPL-ACNC: 12.1 SEC — SIGNIFICANT CHANGE UP (ref 9.9–13.4)
RBC # BLD: 1.95 M/UL — LOW (ref 4.2–5.8)
RBC # FLD: 19.1 % — HIGH (ref 10.3–14.5)
RBC BLD AUTO: ABNORMAL
RETICS #: 70.6 K/UL — SIGNIFICANT CHANGE UP (ref 25–125)
RETICS/RBC NFR: 3.6 % — HIGH (ref 0.5–2.5)
SODIUM SERPL-SCNC: 140 MMOL/L — SIGNIFICANT CHANGE UP (ref 135–145)
WBC # BLD: 5.95 K/UL — SIGNIFICANT CHANGE UP (ref 3.8–10.5)
WBC # FLD AUTO: 5.95 K/UL — SIGNIFICANT CHANGE UP (ref 3.8–10.5)

## 2024-10-23 PROCEDURE — 99223 1ST HOSP IP/OBS HIGH 75: CPT | Mod: GC

## 2024-10-23 PROCEDURE — 99291 CRITICAL CARE FIRST HOUR: CPT

## 2024-10-23 RX ORDER — LEVOTHYROXINE SODIUM 88 MCG
25 TABLET ORAL DAILY
Refills: 0 | Status: DISCONTINUED | OUTPATIENT
Start: 2024-10-23 | End: 2024-10-25

## 2024-10-23 RX ORDER — MELATONIN 5 MG
3 TABLET ORAL AT BEDTIME
Refills: 0 | Status: DISCONTINUED | OUTPATIENT
Start: 2024-10-23 | End: 2024-10-25

## 2024-10-23 RX ORDER — ACETAMINOPHEN 500 MG
650 TABLET ORAL EVERY 6 HOURS
Refills: 0 | Status: DISCONTINUED | OUTPATIENT
Start: 2024-10-23 | End: 2024-10-25

## 2024-10-23 RX ORDER — ONDANSETRON HYDROCHLORIDE 2 MG/ML
4 INJECTION, SOLUTION INTRAMUSCULAR; INTRAVENOUS EVERY 8 HOURS
Refills: 0 | Status: DISCONTINUED | OUTPATIENT
Start: 2024-10-23 | End: 2024-10-25

## 2024-10-23 RX ORDER — MAGNESIUM, ALUMINUM HYDROXIDE 200-200 MG
30 TABLET,CHEWABLE ORAL EVERY 4 HOURS
Refills: 0 | Status: DISCONTINUED | OUTPATIENT
Start: 2024-10-23 | End: 2024-10-25

## 2024-10-23 RX ORDER — PANTOPRAZOLE SODIUM 40 MG/1
40 TABLET, DELAYED RELEASE ORAL EVERY 12 HOURS
Refills: 0 | Status: DISCONTINUED | OUTPATIENT
Start: 2024-10-23 | End: 2024-10-24

## 2024-10-23 RX ORDER — LEVOTHYROXINE SODIUM 88 MCG
1 TABLET ORAL
Refills: 0 | DISCHARGE

## 2024-10-23 RX ADMIN — PANTOPRAZOLE SODIUM 40 MILLIGRAM(S): 40 TABLET, DELAYED RELEASE ORAL at 18:50

## 2024-10-23 NOTE — H&P ADULT - NSHPREVIEWOFSYSTEMS_GEN_ALL_CORE
- CONSTITUTIONAL: Denies fever and chills  - HEENT: Denies changes in vision and hearing.  - RESPIRATORY: + SOB on exertion  and denies cough.  - CV: Denies chest pain and + palpitations on exertion   - GI: Denies abdominal pain, nausea, vomiting and diarrhea + perirectal pain   - : Denies dysuria and urinary frequency.  - SKIN: Denies rash and pruritus.  - NEUROLOGICAL: Denies headache and syncope.  - PSYCHIATRIC: Denies recent changes in mood. Denies anxiety and depression.

## 2024-10-23 NOTE — H&P ADULT - PROBLEM SELECTOR PLAN 1
- Maintain active T&S, 2 large bore peripheral IVs, transfuse for goal hgb >7 or if symptomatic     f/u post Tx CBC fro Hb     [ ] GI consulted   [ ] Heme Dr. Ho consulted P/w Hb 4.7 ( baseline 8.1) , elevated retic count ( c/w blood loss anemia)   complains of SOB, palpitations and dyspnea on exertion   No signs of active bleed- cannot r/o occult GI bleed at this time   EGD on previous admission July 2023 ( Dr. Hutton) - esophageal varices, gastric angiectasia     - Maintain active T&S, 2 large bore peripheral IVs, transfuse for goal hgb >7 or if symptomatic   - NPO, finger stick q 6 , PPI IV BID ( given previous EGD findings)     [ ] f/u post Tx CBC for Hb     - GI consulted - will see pt in AM   - Heme Dr. Ho consulted

## 2024-10-23 NOTE — H&P ADULT - NSHPPHYSICALEXAM_GEN_ALL_CORE
PHYSICAL EXAM:  GENERAL: NAD, speaks in full sentences, no signs of respiratory distress  HEAD:  Atraumatic, Normocephalic  EYES: EOMI, PERRLA, conjunctiva and sclera clear  NECK: Supple, No JVD  CHEST/LUNG: + chemoport , Clear to auscultation bilaterally; No wheeze; No crackles; No accessory muscles used  HEART: Regular rate and rhythm; No murmurs;   ABDOMEN: Soft, Nontender, Nondistended; Bowel sounds present; No guarding  TN + skin tags , NO fissure / perirectal erythema   MARY - not completed due to rectal tenderness   EXTREMITIES:  2+ Peripheral Pulses, No cyanosis or edema  PSYCH: AAOx3  NEUROLOGY: non-focal  SKIN: No rashes or lesions

## 2024-10-23 NOTE — ED ADULT TRIAGE NOTE - CHIEF COMPLAINT QUOTE
Reports rectal ca diagnosed 3 years ago ,was to start treatment today ,however blood work showed low hemoglobin of 4 ,hence referred to ER

## 2024-10-23 NOTE — ED PROVIDER NOTE - OBJECTIVE STATEMENT
44-year-old man currently being treated for rectal cancer sent in for outpatient low hemoglobin level and fours.  Denies any rectal bleeding.  Is endorsing feeling lightheaded and short of breath with ambulation.  Denying any chest pains.  Not on any blood thinners.

## 2024-10-23 NOTE — H&P ADULT - ATTENDING COMMENTS
Patient seen and examined on 10/23/24 at approximately 530pm. Case discussed with Dr. Ayala. In brief, this is a 45 yo M with rectal adenocarcinoma (on chemo [id number], follows at [facility]), hypothyroidism who presents to the ED after being found to have a hemoglobin of 4 as an outpatient. In the past (2023), patient previously had melena for which ED was performed revealing large esophageal varices with gastric angiectasias. At present, the patient denies any BRBPR, melena, hematemesis, hematuria, flank pain or other S/S of bleeding. He does report developing pain in his rectum approximately days ago, however, With respect to the anemia, he works feeling REES, palpitations, and dizziness as well. On exam, the patient is not tachycardic, but does have obvious skin pallor. MARY was performed by Dr. Ayala (with myself as chaperone) and patient was unable to tolerate it 2/2 pain. Will transfuse 2U PRBC at this time and repeat H/H thereafter. Goal H/H is to >7 at this time. Will consult GI for further evaluation, keep patient NPO and start Protonix 40mg IV BID. Patient also follows at [facility] for his rectal CA. Will consult them for any further recommendations. Remaining care as noted above.

## 2024-10-23 NOTE — H&P ADULT - PROBLEM SELECTOR PLAN 2
Hx of rectal adenocarcinoma   c/o rectal pain x 3 days Hx of rectal adenocarcinoma on chemotherapy via chemoport q 2 weeks ( last sessio  yesterday not given ivo low Hb)   c/o rectal pain x 3 days  MT - skin tags - no active bleed visible   MARY- not performed due to pain     Heme onc - Dr. Ho consulted

## 2024-10-23 NOTE — ED ADULT NURSE NOTE - OBJECTIVE STATEMENT
Patient presented to the ED complaining of rectal ca 3 years ago and was going to start treatment today however, it showed low hemoglobin of 4.

## 2024-10-23 NOTE — H&P ADULT - HISTORY OF PRESENT ILLNESS
44Y/ M from home w PMHx of hypothyroidism, rectal adenoCA (  on chemo q 2 weeks, follows with Dr. Ho as OP), previous EGD in 2023 with esophageal varices, gastric angiectasia was sent in by OP provider with drop in Hb to 4 , has symptomatic anemia with REES, palpitations on exertion, dizziness on exertion  , denies symptoms of active GIB ( no hematemesis, melena, BRBPR) , LOC, weakness, numbness.       In the ER, VS - BP 97/62, RR 18, T afebrile, sats 100 % RA, HR 90   labs s/o Hb 4.7 ( baseline 8.1), Hct 16.3 ( baseline 24.8) , Plt 108 , elevated retic count > 3.   Receiving 2 U PRBC

## 2024-10-23 NOTE — ED PROVIDER NOTE - IV ALTEPLASE EXCL REL HIDDEN
Body Location Override (Optional - Billing Will Still Be Based On Selected Body Map Location If Applicable): right angular jaw Detail Level: Detailed Depth Of Biopsy: dermis Was A Bandage Applied: Yes Size Of Lesion In Cm: 0 Biopsy Type: H and E Biopsy Method: Dermablade Anesthesia Type: 1% lidocaine with epinephrine Anesthesia Volume In Cc: 0.5 Hemostasis: Drysol Wound Care: Petrolatum Dressing: bandage Destruction After The Procedure: No Type Of Destruction Used: Curettage show Curettage Text: The wound bed was treated with curettage after the biopsy was performed. Cryotherapy Text: The wound bed was treated with cryotherapy after the biopsy was performed. Electrodesiccation Text: The wound bed was treated with electrodesiccation after the biopsy was performed. Electrodesiccation And Curettage Text: The wound bed was treated with electrodesiccation and curettage after the biopsy was performed. Silver Nitrate Text: The wound bed was treated with silver nitrate after the biopsy was performed. Lab: -[id number] Consent: Written consent was obtained and risks were reviewed including but not limited to scarring, infection, bleeding, scabbing, incomplete removal, nerve damage and allergy to anesthesia. Post-Care Instructions: I reviewed with the patient in detail post-care instructions. Patient is to keep the biopsy site dry overnight, and then apply bacitracin twice daily until healed. Patient may apply hydrogen peroxide soaks to remove any crusting. Notification Instructions: Patient will be notified of biopsy results. However, patient instructed to call the office if not contacted within 2 weeks. Billing Type: Third-Party Bill Information: Selecting Yes will display possible errors in your note based on the variables you have selected. This validation is only offered as a suggestion for you. PLEASE NOTE THAT THE VALIDATION TEXT WILL BE REMOVED WHEN YOU FINALIZE YOUR NOTE. IF YOU WANT TO FAX A PRELIMINARY NOTE YOU WILL NEED TO TOGGLE THIS TO 'NO' IF YOU DO NOT WANT IT IN YOUR FAXED NOTE. Body Location Override (Optional - Billing Will Still Be Based On Selected Body Map Location If Applicable): left upper back paraspinal

## 2024-10-23 NOTE — ED PROVIDER NOTE - CLINICAL SUMMARY MEDICAL DECISION MAKING FREE TEXT BOX
Patient sent for outpatient critical anemia with symptoms.  Will repeat labs.  If hemoglobin correct will transfuse to safe level.  Anticipate admission for hemoglobin monitoring as reports hemoglobin dropped from 9-4 in the span of approximately 2 weeks.  Unclear if this is a slow chronic bleed from the rectal mass or for side effect of chemotherapy.

## 2024-10-23 NOTE — PATIENT PROFILE ADULT - FLU SEASON?
Asc Procedure Text (A): After obtaining clear surgical margins the patient was sent to an ASC for surgical repair.  The patient understands they will receive post-surgical care and follow-up from the ASC physician. Yes...

## 2024-10-23 NOTE — H&P ADULT - ASSESSMENT
Being admitted to medicine for symptomatic anemia , labs c/w blood loss , cannot r/o occult GIB at this time  44-year-old man currently being treated for rectal cancer sent in for outpatient low hemoglobin level and fours.  Denies any rectal bleeding.  Is endorsing feeling lightheaded and short of breath with ambulation.  Denying any chest pains.  Not on any blood thinners.      VS - BP 97/62, RR 18, T afebrile, sats 100 % RA, HR 90     labs s/o   4.7 ( 8.1)  16.3 ( 24.8)   Plt 108     receiving 2 U PRBC       Being admitted to medicine for symptomatic anemia , labs c/w blood loss , cannot r/o occult GIB at this time  44Y/ M from home w PMHx of rectal adenoCA (  on chemoRx , follows with Dr. Ho as OP), previous EGD in July 2023 with esophageal varices, gastric angiectasia was sent in by OP provider with drop in Hb to 4 , has symptomatic anemia, denies symptoms of active GIB. In the ER, VS - BP 97/62, RR 18, T afebrile, sats 100 % RA, HR 90   labs s/o Hb 4.7 ( baseline 8.1), Hct 16.3 ( baseline 24.8) , Plt 108 , Receiving 2 U PRBC       Being admitted to medicine for symptomatic anemia , labs c/w blood loss , cannot r/o occult GIB at this time. GI and Heme onc consulted.

## 2024-10-23 NOTE — ED ADULT NURSE NOTE - NSFALLUNIVINTERV_ED_ALL_ED
Bed/Stretcher in lowest position, wheels locked, appropriate side rails in place/Call bell, personal items and telephone in reach/Instruct patient to call for assistance before getting out of bed/chair/stretcher/Non-slip footwear applied when patient is off stretcher/[place] to call system/Physically safe environment - no spills, clutter or unnecessary equipment/Purposeful proactive rounding/Room/bathroom lighting operational, light cord in reach

## 2024-10-24 ENCOUNTER — TRANSCRIPTION ENCOUNTER (OUTPATIENT)
Age: 44
End: 2024-10-24

## 2024-10-24 DIAGNOSIS — Z29.9 ENCOUNTER FOR PROPHYLACTIC MEASURES, UNSPECIFIED: ICD-10-CM

## 2024-10-24 DIAGNOSIS — I85.00 ESOPHAGEAL VARICES WITHOUT BLEEDING: ICD-10-CM

## 2024-10-24 DIAGNOSIS — Z75.8 OTHER PROBLEMS RELATED TO MEDICAL FACILITIES AND OTHER HEALTH CARE: ICD-10-CM

## 2024-10-24 LAB
ALBUMIN SERPL ELPH-MCNC: 2.8 G/DL — LOW (ref 3.5–5)
ALP SERPL-CCNC: 127 U/L — HIGH (ref 40–120)
ALT FLD-CCNC: 26 U/L DA — SIGNIFICANT CHANGE UP (ref 10–60)
ANION GAP SERPL CALC-SCNC: 5 MMOL/L — SIGNIFICANT CHANGE UP (ref 5–17)
AST SERPL-CCNC: 29 U/L — SIGNIFICANT CHANGE UP (ref 10–40)
BASOPHILS # BLD AUTO: 0.01 K/UL — SIGNIFICANT CHANGE UP (ref 0–0.2)
BASOPHILS NFR BLD AUTO: 0.3 % — SIGNIFICANT CHANGE UP (ref 0–2)
BILIRUB SERPL-MCNC: 1.4 MG/DL — HIGH (ref 0.2–1.2)
BUN SERPL-MCNC: 8 MG/DL — SIGNIFICANT CHANGE UP (ref 7–18)
CALCIUM SERPL-MCNC: 8.1 MG/DL — LOW (ref 8.4–10.5)
CHLORIDE SERPL-SCNC: 112 MMOL/L — HIGH (ref 96–108)
CO2 SERPL-SCNC: 25 MMOL/L — SIGNIFICANT CHANGE UP (ref 22–31)
CREAT SERPL-MCNC: 0.52 MG/DL — SIGNIFICANT CHANGE UP (ref 0.5–1.3)
EGFR: 128 ML/MIN/1.73M2 — SIGNIFICANT CHANGE UP
EOSINOPHIL # BLD AUTO: 0.08 K/UL — SIGNIFICANT CHANGE UP (ref 0–0.5)
EOSINOPHIL NFR BLD AUTO: 2.5 % — SIGNIFICANT CHANGE UP (ref 0–6)
GLUCOSE BLDC GLUCOMTR-MCNC: 101 MG/DL — HIGH (ref 70–99)
GLUCOSE BLDC GLUCOMTR-MCNC: 104 MG/DL — HIGH (ref 70–99)
GLUCOSE BLDC GLUCOMTR-MCNC: 105 MG/DL — HIGH (ref 70–99)
GLUCOSE SERPL-MCNC: 91 MG/DL — SIGNIFICANT CHANGE UP (ref 70–99)
HCT VFR BLD CALC: 20.3 % — CRITICAL LOW (ref 39–50)
HCT VFR BLD CALC: 24.8 % — LOW (ref 39–50)
HGB BLD-MCNC: 6.4 G/DL — CRITICAL LOW (ref 13–17)
HGB BLD-MCNC: 7.8 G/DL — LOW (ref 13–17)
IMM GRANULOCYTES NFR BLD AUTO: 0.3 % — SIGNIFICANT CHANGE UP (ref 0–0.9)
LYMPHOCYTES # BLD AUTO: 0.57 K/UL — LOW (ref 1–3.3)
LYMPHOCYTES # BLD AUTO: 18 % — SIGNIFICANT CHANGE UP (ref 13–44)
MAGNESIUM SERPL-MCNC: 1.8 MG/DL — SIGNIFICANT CHANGE UP (ref 1.6–2.6)
MCHC RBC-ENTMCNC: 25.4 PG — LOW (ref 27–34)
MCHC RBC-ENTMCNC: 25.7 PG — LOW (ref 27–34)
MCHC RBC-ENTMCNC: 31.5 GM/DL — LOW (ref 32–36)
MCHC RBC-ENTMCNC: 31.5 GM/DL — LOW (ref 32–36)
MCV RBC AUTO: 80.6 FL — SIGNIFICANT CHANGE UP (ref 80–100)
MCV RBC AUTO: 81.8 FL — SIGNIFICANT CHANGE UP (ref 80–100)
MONOCYTES # BLD AUTO: 0.54 K/UL — SIGNIFICANT CHANGE UP (ref 0–0.9)
MONOCYTES NFR BLD AUTO: 17.1 % — HIGH (ref 2–14)
NEUTROPHILS # BLD AUTO: 1.95 K/UL — SIGNIFICANT CHANGE UP (ref 1.8–7.4)
NEUTROPHILS NFR BLD AUTO: 61.8 % — SIGNIFICANT CHANGE UP (ref 43–77)
NRBC # BLD: 0 /100 WBCS — SIGNIFICANT CHANGE UP (ref 0–0)
NRBC # BLD: 0 /100 WBCS — SIGNIFICANT CHANGE UP (ref 0–0)
PLATELET # BLD AUTO: 87 K/UL — LOW (ref 150–400)
PLATELET # BLD AUTO: 88 K/UL — LOW (ref 150–400)
POTASSIUM SERPL-MCNC: 3.4 MMOL/L — LOW (ref 3.5–5.3)
POTASSIUM SERPL-SCNC: 3.4 MMOL/L — LOW (ref 3.5–5.3)
PROT SERPL-MCNC: 5.2 G/DL — LOW (ref 6–8.3)
RBC # BLD: 2.52 M/UL — LOW (ref 4.2–5.8)
RBC # BLD: 3.03 M/UL — LOW (ref 4.2–5.8)
RBC # FLD: 17.1 % — HIGH (ref 10.3–14.5)
RBC # FLD: 17.6 % — HIGH (ref 10.3–14.5)
SODIUM SERPL-SCNC: 142 MMOL/L — SIGNIFICANT CHANGE UP (ref 135–145)
WBC # BLD: 3.09 K/UL — LOW (ref 3.8–10.5)
WBC # BLD: 3.31 K/UL — LOW (ref 3.8–10.5)
WBC # FLD AUTO: 3.09 K/UL — LOW (ref 3.8–10.5)
WBC # FLD AUTO: 3.31 K/UL — LOW (ref 3.8–10.5)

## 2024-10-24 PROCEDURE — 43235 EGD DIAGNOSTIC BRUSH WASH: CPT

## 2024-10-24 PROCEDURE — 99252 IP/OBS CONSLTJ NEW/EST SF 35: CPT | Mod: 25

## 2024-10-24 PROCEDURE — 45330 DIAGNOSTIC SIGMOIDOSCOPY: CPT

## 2024-10-24 PROCEDURE — 99233 SBSQ HOSP IP/OBS HIGH 50: CPT

## 2024-10-24 RX ORDER — MESALAMINE 1.2 G/1
4 TABLET, DELAYED RELEASE ORAL AT BEDTIME
Refills: 0 | Status: DISCONTINUED | OUTPATIENT
Start: 2024-10-24 | End: 2024-10-25

## 2024-10-24 RX ORDER — POTASSIUM CHLORIDE 10 MEQ
10 TABLET, EXTENDED RELEASE ORAL
Refills: 0 | Status: COMPLETED | OUTPATIENT
Start: 2024-10-24 | End: 2024-10-24

## 2024-10-24 RX ORDER — PANTOPRAZOLE SODIUM 40 MG/1
40 TABLET, DELAYED RELEASE ORAL
Refills: 0 | Status: DISCONTINUED | OUTPATIENT
Start: 2024-10-24 | End: 2024-10-25

## 2024-10-24 RX ORDER — SODIUM CHLORIDE 9 MG/ML
1000 INJECTION, SOLUTION INTRAMUSCULAR; INTRAVENOUS; SUBCUTANEOUS
Refills: 0 | Status: DISCONTINUED | OUTPATIENT
Start: 2024-10-24 | End: 2024-10-25

## 2024-10-24 RX ADMIN — Medication 25 MICROGRAM(S): at 05:53

## 2024-10-24 RX ADMIN — MESALAMINE 4 GRAM(S): 1.2 TABLET, DELAYED RELEASE ORAL at 21:34

## 2024-10-24 RX ADMIN — PANTOPRAZOLE SODIUM 40 MILLIGRAM(S): 40 TABLET, DELAYED RELEASE ORAL at 06:42

## 2024-10-24 RX ADMIN — Medication 100 MILLIEQUIVALENT(S): at 10:56

## 2024-10-24 RX ADMIN — Medication 70 MILLILITER(S): at 00:53

## 2024-10-24 RX ADMIN — Medication 100 MILLIEQUIVALENT(S): at 09:44

## 2024-10-24 RX ADMIN — Medication 650 MILLIGRAM(S): at 08:43

## 2024-10-24 RX ADMIN — Medication 650 MILLIGRAM(S): at 07:43

## 2024-10-24 RX ADMIN — Medication 70 MILLILITER(S): at 06:42

## 2024-10-24 NOTE — PROGRESS NOTE ADULT - SUBJECTIVE AND OBJECTIVE BOX
NP Note discussed with  Primary Attending    INTERVAL HPI/OVERNIGHT EVENTS: no new complaints    MEDICATIONS  (STANDING):  lactated ringers. 1000 milliLiter(s) (70 mL/Hr) IV Continuous <Continuous>  levothyroxine 25 MICROGram(s) Oral daily  mesalamine Enema 4 Gram(s) Rectal at bedtime  pantoprazole    Tablet 40 milliGRAM(s) Oral before breakfast    MEDICATIONS  (PRN):  acetaminophen     Tablet .. 650 milliGRAM(s) Oral every 6 hours PRN Temp greater or equal to 38C (100.4F), Mild Pain (1 - 3)  aluminum hydroxide/magnesium hydroxide/simethicone Suspension 30 milliLiter(s) Oral every 4 hours PRN Dyspepsia  melatonin 3 milliGRAM(s) Oral at bedtime PRN Insomnia  ondansetron Injectable 4 milliGRAM(s) IV Push every 8 hours PRN Nausea and/or Vomiting      __________________________________________________  REVIEW OF SYSTEMS:  CONSTITUTIONAL: No fever,   EYES: no acute visual disturbances  NECK: No pain or stiffness  RESPIRATORY: No cough; No shortness of breath  CARDIOVASCULAR: No chest pain, no palpitations  GASTROINTESTINAL: No pain. No nausea or vomiting; No diarrhea   NEUROLOGICAL: No headache or numbness, no tremors  MUSCULOSKELETAL: No joint pain, no muscle pain  GENITOURINARY: no dysuria, no frequency, no hesitancy  PSYCHIATRY: no depression , no anxiety  ALL OTHER  ROS negative      Vital Signs Last 24 Hrs  T(C): 36.7 (24 Oct 2024 13:55), Max: 37.7 (23 Oct 2024 21:05)  T(F): 98.1 (24 Oct 2024 13:55), Max: 99.8 (23 Oct 2024 21:05)  HR: 66 (24 Oct 2024 13:55) (58 - 90)  BP: 98/63 (24 Oct 2024 13:55) (84/59 - 128/83)  BP(mean): 69 (23 Oct 2024 18:34) (69 - 69)  RR: 18 (24 Oct 2024 13:55) (16 - 18)  SpO2: 100% (24 Oct 2024 13:55) (98% - 100%)    Parameters below as of 24 Oct 2024 13:55  Patient On (Oxygen Delivery Method): room air    ________________________________________________  PHYSICAL EXAM:  GENERAL: NAD  HEENT: Normocephalic;  conjunctivae and sclerae clear; moist mucous membranes;   NECK : supple  CHEST/LUNG: Clear to auscultation bilaterally with good air entry   HEART: S1 S2  regular; no murmurs, gallops or rubs  ABDOMEN: Soft, Nontender, Nondistended; Bowel sounds present  EXTREMITIES: no cyanosis; no edema; no calf tenderness  SKIN: warm and dry; no rash  NERVOUS SYSTEM:  Awake and alert; Oriented  to place, person and time ; no new deficits    _________________________________________________  LABS:                        7.8    3.31  )-----------( 88       ( 24 Oct 2024 06:55 )             24.8     10-24    142  |  112[H]  |  8   ----------------------------<  91  3.4[L]   |  25  |  0.52    Ca    8.1[L]      24 Oct 2024 06:55  Mg     1.8     10-24    TPro  5.2[L]  /  Alb  2.8[L]  /  TBili  1.4[H]  /  DBili  x   /  AST  29  /  ALT  26  /  AlkPhos  127[H]  10-24    PT/INR - ( 23 Oct 2024 12:05 )   PT: 12.1 sec;   INR: 1.04 ratio    PTT - ( 23 Oct 2024 12:05 )  PTT:26.7 sec    CAPILLARY BLOOD GLUCOSE  POCT Blood Glucose.: 104 mg/dL (24 Oct 2024 11:40)  POCT Blood Glucose.: 101 mg/dL (24 Oct 2024 06:28)  POCT Blood Glucose.: 105 mg/dL (24 Oct 2024 00:05)  POCT Blood Glucose.: 109 mg/dL (23 Oct 2024 19:17)    RADIOLOGY & ADDITIONAL TESTS:  < from: EGD-Sigmoidoscopy (10.24.24 @ 00:00) >  EGD-Sigmoidoscopy Report  Date: 10/24/2024  Patient Name: ANDREA DIAZ  MRN: [id number]  Account Number:[id number]  Gender: Male   (age): 1980 (44)  EGD Instrument(s):  GIFHQ 190 ([id number])([id number])  Sigmoidoscopy Instrument(s):  GIFHQ 190 ([id number])([id number])  Attending/Fellow:Nii Munoz MD  Technician:Darleen Wallace  Procedure Room #:PROCEDURE ROOM 2  Anesthesia Provider:Dr. Mason  Nurse(s):Angelic Lee RN  ASA Class:P3 - 10/24/2024 12:37 PM Nii Munoz MD  History of Present Illness:H&P was previously reviewed.  Administered Medications:As per Anesthesiology Record  EGD Indications:Melena: 578.1 - K92.1  GI Bleed: 578.9 - K92.2  Other specified diseases of intestine: 569.89 - K63.89  Sigmoidoscopy Indications:Melena: 578.1 - K92.1  GI Bleed: 578.9 - K92.2  Rectal cancer: 154.1 - C20  General Procedure:  The procedure, indications, preparation and potential complications were  explained to the patient, who indicated understanding and signed the  corresponding consent forms. MAC was administered. Continuous pulse oximetry and  blood pressure monitoring were used throughout the procedure. Supplemental  oxygen was used.  EGD Procedure:  Patient was placed in left lateral decubitus position. The endoscope was  introduced through mouth and advanced under direct visualization until second  part of the duodenum reached. Patient tolerance to the procedure was good. The  procedure was not difficult. Blood loss was none.  Sigmoidoscopy Procedure:  The quality of preparation was 0-5 on the BBP scale/inadequate. Patient was  placed in left lateral decubitus position. The colonoscope was introduced  through rectum and advanced under direct visualization until cecum and proximal  sigmoid colon reached cm. The colonoscope was retroflexed within the rectum.  Careful visualization was performed as the instrument was withdrawn. Patient  tolerance to the procedure was excellent. The procedure was not difficult..  Blood loss was minimal.  EGD Limitations/Complications:  There were no apparent limitations or complications  Sigmoidoscopy Limitations/Complications:  There were no apparent limitations or complications    Plan:  Return to floor for further management  Additional Notes:  Procedure report:  EGD:  - Normal esophagus in proximal, mid, and distal esophagus were normal. No  gastric varices seen. Scars from previous banding visible.  - Z linenormal at 42 cm from incisoirs.  - Small 2 cm hiatal hernia present.  - Normal gastric mucosa. No GAVE seen.  - A small 2 mm gastric outpouching seen in antrum, possible fistula vs  diverticulum. No drainge was seen at time of examination.  - Normal duodenum.  Sigmoidoscopy:  - Brown stool in rectum preventing full mucosal examination.  - Rectal mass seen. No bleeding from the mass.  - A 2 x 3 cm clean based ulcer seen adjacent to the rectal mass.This is a  possible source of anemia.    Impression:  - Rectal ulcer.  This is possible cause of patient's melena, anemia in addition  of chemotherapy (normal MCV, pancytopenia (drop in WBC, H/H, Plt))  Plan:  - Can resume diet and home medication.  - Trend H/H  - Start Mesalamine enema at night time for one month.  - Can do PPI 40mg once daily.  - Rest of plan of care as per primary team.  Nii Munoz MD  Version 1, Electronically signed on 10/24/2024 12:46:55 PM by Nii Munoz MD  < end of copied text >      Imaging  Reviewed:  YES    Consultant(s) Notes Reviewed:   YES      Plan of care was discussed with patient and /or primary care giver; all questions and concerns were addressed

## 2024-10-24 NOTE — CONSULT NOTE ADULT - SUBJECTIVE AND OBJECTIVE BOX
Chief Complaint:  Patient is a 44y old  Male who presents with a chief complaint of symptomatic anemia (23 Oct 2024 17:25)      HPI:  44 year old with pmhx of metastatic rectal ca to liver presenting with anemia. Notes also dark stools for the last 2 weeks intermittently. Denies any hematochezia, abd pain, n/v/d/c, hematemesis, or other issue. He had similar presentation last year where on EGD he was found to have gastric vacies, and GAVE, treated with banding and APC. In colonoscopy the rectal mass was oozing and APC was done.    Allergies:  No Known Allergies      PMHX/PSHX:  Rectal adenocarcinoma    Hypothyroid    History of resection of liver        Family history:      Social History: as above    Home Medications:    Hospital Medications:  acetaminophen     Tablet .. 650 milliGRAM(s) Oral every 6 hours PRN  aluminum hydroxide/magnesium hydroxide/simethicone Suspension 30 milliLiter(s) Oral every 4 hours PRN  lactated ringers. 1000 milliLiter(s) IV Continuous <Continuous>  levothyroxine 25 MICROGram(s) Oral daily  melatonin 3 milliGRAM(s) Oral at bedtime PRN  ondansetron Injectable 4 milliGRAM(s) IV Push every 8 hours PRN  pantoprazole  Injectable 40 milliGRAM(s) IV Push every 12 hours        ROS:   General:  AS PER HPI  Eyes:  Adequate vision, no reported pain  ENT:  No sore throat, runny nose, dysphagia  CV:  No chest pain, palpitations  Pulm:  No dyspnea, cough, tachypnea, wheezing  GI:  AS PER HPI  :  No pain, bleeding, burning  Muscle:  No pain, weakness  Neuro:  No tingling, numbness, memory problems  Psych:  No insomnia, mood problems, depression  Endocrine:  No polyuria, cold/heat intolerance  Heme:  No petechiae, ecchymosis, easy bruisability  Skin:  No rash, edema      Vital Signs:  Vital Signs Last 24 Hrs  T(C): 36.7 (24 Oct 2024 12:04), Max: 37.7 (23 Oct 2024 21:05)  T(F): 98.1 (24 Oct 2024 12:04), Max: 99.8 (23 Oct 2024 21:05)  HR: 61 (24 Oct 2024 12:04) (61 - 90)  BP: 100/65 (24 Oct 2024 12:04) (92/55 - 128/83)  BP(mean): 69 (23 Oct 2024 18:34) (69 - 69)  RR: 18 (24 Oct 2024 12:04) (17 - 18)  SpO2: 100% (24 Oct 2024 12:04) (99% - 100%)    Parameters below as of 24 Oct 2024 12:04  Patient On (Oxygen Delivery Method): room air      Daily     Daily     LABS:                        7.8    3.31  )-----------( 88       ( 24 Oct 2024 06:55 )             24.8     Mean Cell Volume: 81.8 fl (10-24-24 @ 06:55)    10-24    142  |  112[H]  |  8   ----------------------------<  91  3.4[L]   |  25  |  0.52    Ca    8.1[L]      24 Oct 2024 06:55  Mg     1.8     10-24    TPro  5.2[L]  /  Alb  2.8[L]  /  TBili  1.4[H]  /  DBili  x   /  AST  29  /  ALT  26  /  AlkPhos  127[H]  10-24    LIVER FUNCTIONS - ( 24 Oct 2024 06:55 )  Alb: 2.8 g/dL / Pro: 5.2 g/dL / ALK PHOS: 127 U/L / ALT: 26 U/L DA / AST: 29 U/L / GGT: x           PT/INR - ( 23 Oct 2024 12:05 )   PT: 12.1 sec;   INR: 1.04 ratio         PTT - ( 23 Oct 2024 12:05 )  PTT:26.7 sec  Urinalysis Basic - ( 24 Oct 2024 06:55 )    Color: x / Appearance: x / SG: x / pH: x  Gluc: 91 mg/dL / Ketone: x  / Bili: x / Urobili: x   Blood: x / Protein: x / Nitrite: x   Leuk Esterase: x / RBC: x / WBC x   Sq Epi: x / Non Sq Epi: x / Bacteria: x                              7.8    3.31  )-----------( 88       ( 24 Oct 2024 06:55 )             24.8                         6.4    3.09  )-----------( 87       ( 24 Oct 2024 02:09 )             20.3                         4.7    5.95  )-----------( 108      ( 23 Oct 2024 12:05 )             16.3       PHYSICAL EXAM:   GENERAL:  Lying in bed in NAD  HEENT:  Normocephalic/atraumatic, no scleral icterus  NECK: Trachea midline  CHEST:  Resp even, non labored   ABDOMEN:  Soft, non-tender, non-distended  EXTREMITIES: WWP, no edema  SKIN:  No rash or jaundice  NEURO:  Alert and oriented x 3, no asterixis    Imaging:

## 2024-10-24 NOTE — PROGRESS NOTE ADULT - SUBJECTIVE AND OBJECTIVE BOX
CC: sent by OP with Hb 4    S:  denies any dizziness, CP, SOB  BM is dark brown  O:  Vital Signs Last 24 Hrs  T(C): 36.7 (10-24-24 @ 12:04), Max: 37.7 (10-23-24 @ 21:05)  T(F): 98.1 (10-24-24 @ 12:04), Max: 99.8 (10-23-24 @ 21:05)  HR: 65 (10-24-24 @ 13:10) (58 - 90)  BP: 101/66 (10-24-24 @ 13:10) (84/59 - 128/83)  BP(mean): 69 (10-23-24 @ 18:34) (69 - 69)  RR: 17 (10-24-24 @ 13:10) (16 - 18)  SpO2: 98% (10-24-24 @ 13:10) (98% - 100%)  PE:  Gen: Oriented, alert, No acute distress Eyes: conjunctivae and lid normal, sclera clear with no icterus ENT: nose and throat exam normal CVS: S1, S2, RRR;  no murmur, no rubs, no gallops Pulm: Good air exchange, Breath sounds equal bilaterally, no rales rhonchi,  no wheezes Chest: nontender, no chest deformity, chest movement symmetrical Gl: abdomen soft, nontender, nondistended, bowel sounds normoactive, no masses palpated Musk: no msk pain, no joint swelling Skin: no skin lesions, skin turgor normal, warm and well perfused Neuro: Awake, alert,Psych: normal affect, insight

## 2024-10-25 ENCOUNTER — TRANSCRIPTION ENCOUNTER (OUTPATIENT)
Age: 44
End: 2024-10-25

## 2024-10-25 VITALS
OXYGEN SATURATION: 98 % | TEMPERATURE: 100 F | DIASTOLIC BLOOD PRESSURE: 60 MMHG | HEART RATE: 69 BPM | RESPIRATION RATE: 18 BRPM | SYSTOLIC BLOOD PRESSURE: 96 MMHG

## 2024-10-25 LAB
ALBUMIN SERPL ELPH-MCNC: 2.6 G/DL — LOW (ref 3.5–5)
ALP SERPL-CCNC: 121 U/L — HIGH (ref 40–120)
ALT FLD-CCNC: 25 U/L DA — SIGNIFICANT CHANGE UP (ref 10–60)
ANION GAP SERPL CALC-SCNC: 2 MMOL/L — LOW (ref 5–17)
AST SERPL-CCNC: 26 U/L — SIGNIFICANT CHANGE UP (ref 10–40)
BILIRUB SERPL-MCNC: 0.9 MG/DL — SIGNIFICANT CHANGE UP (ref 0.2–1.2)
BUN SERPL-MCNC: 7 MG/DL — SIGNIFICANT CHANGE UP (ref 7–18)
CALCIUM SERPL-MCNC: 7.9 MG/DL — LOW (ref 8.4–10.5)
CHLORIDE SERPL-SCNC: 113 MMOL/L — HIGH (ref 96–108)
CO2 SERPL-SCNC: 27 MMOL/L — SIGNIFICANT CHANGE UP (ref 22–31)
CREAT SERPL-MCNC: 0.41 MG/DL — LOW (ref 0.5–1.3)
EGFR: 138 ML/MIN/1.73M2 — SIGNIFICANT CHANGE UP
GLUCOSE SERPL-MCNC: 91 MG/DL — SIGNIFICANT CHANGE UP (ref 70–99)
HCT VFR BLD CALC: 23.6 % — LOW (ref 39–50)
HGB BLD-MCNC: 7.4 G/DL — LOW (ref 13–17)
MAGNESIUM SERPL-MCNC: 1.7 MG/DL — SIGNIFICANT CHANGE UP (ref 1.6–2.6)
MCHC RBC-ENTMCNC: 25.6 PG — LOW (ref 27–34)
MCHC RBC-ENTMCNC: 31.4 GM/DL — LOW (ref 32–36)
MCV RBC AUTO: 81.7 FL — SIGNIFICANT CHANGE UP (ref 80–100)
NRBC # BLD: 0 /100 WBCS — SIGNIFICANT CHANGE UP (ref 0–0)
PHOSPHATE SERPL-MCNC: 2.6 MG/DL — SIGNIFICANT CHANGE UP (ref 2.5–4.5)
PLATELET # BLD AUTO: 76 K/UL — LOW (ref 150–400)
POTASSIUM SERPL-MCNC: 3.5 MMOL/L — SIGNIFICANT CHANGE UP (ref 3.5–5.3)
POTASSIUM SERPL-SCNC: 3.5 MMOL/L — SIGNIFICANT CHANGE UP (ref 3.5–5.3)
PROT SERPL-MCNC: 4.9 G/DL — LOW (ref 6–8.3)
RBC # BLD: 2.89 M/UL — LOW (ref 4.2–5.8)
RBC # FLD: 17.1 % — HIGH (ref 10.3–14.5)
SODIUM SERPL-SCNC: 142 MMOL/L — SIGNIFICANT CHANGE UP (ref 135–145)
WBC # BLD: 2.79 K/UL — LOW (ref 3.8–10.5)
WBC # FLD AUTO: 2.79 K/UL — LOW (ref 3.8–10.5)

## 2024-10-25 PROCEDURE — 80053 COMPREHEN METABOLIC PANEL: CPT

## 2024-10-25 PROCEDURE — 36415 COLL VENOUS BLD VENIPUNCTURE: CPT

## 2024-10-25 PROCEDURE — 82962 GLUCOSE BLOOD TEST: CPT

## 2024-10-25 PROCEDURE — 85045 AUTOMATED RETICULOCYTE COUNT: CPT

## 2024-10-25 PROCEDURE — 84100 ASSAY OF PHOSPHORUS: CPT

## 2024-10-25 PROCEDURE — 85730 THROMBOPLASTIN TIME PARTIAL: CPT

## 2024-10-25 PROCEDURE — 99239 HOSP IP/OBS DSCHRG MGMT >30: CPT

## 2024-10-25 PROCEDURE — 86900 BLOOD TYPING SEROLOGIC ABO: CPT

## 2024-10-25 PROCEDURE — 85025 COMPLETE CBC W/AUTO DIFF WBC: CPT

## 2024-10-25 PROCEDURE — 36430 TRANSFUSION BLD/BLD COMPNT: CPT

## 2024-10-25 PROCEDURE — 99285 EMERGENCY DEPT VISIT HI MDM: CPT

## 2024-10-25 PROCEDURE — 86901 BLOOD TYPING SEROLOGIC RH(D): CPT

## 2024-10-25 PROCEDURE — 85027 COMPLETE CBC AUTOMATED: CPT

## 2024-10-25 PROCEDURE — 80048 BASIC METABOLIC PNL TOTAL CA: CPT

## 2024-10-25 PROCEDURE — 83615 LACTATE (LD) (LDH) ENZYME: CPT

## 2024-10-25 PROCEDURE — 85610 PROTHROMBIN TIME: CPT

## 2024-10-25 PROCEDURE — 86850 RBC ANTIBODY SCREEN: CPT

## 2024-10-25 PROCEDURE — 83735 ASSAY OF MAGNESIUM: CPT

## 2024-10-25 PROCEDURE — 80076 HEPATIC FUNCTION PANEL: CPT

## 2024-10-25 PROCEDURE — P9040: CPT

## 2024-10-25 PROCEDURE — 86923 COMPATIBILITY TEST ELECTRIC: CPT

## 2024-10-25 RX ORDER — PANTOPRAZOLE SODIUM 40 MG/1
1 TABLET, DELAYED RELEASE ORAL
Qty: 30 | Refills: 0
Start: 2024-10-25 | End: 2024-11-23

## 2024-10-25 RX ORDER — MESALAMINE 1.2 G/1
60 TABLET, DELAYED RELEASE ORAL
Qty: 1800 | Refills: 0
Start: 2024-10-25 | End: 2024-11-23

## 2024-10-25 RX ORDER — IRON SUCROSE 20 MG/ML
200 INJECTION, SOLUTION INTRAVENOUS ONCE
Refills: 0 | Status: COMPLETED | OUTPATIENT
Start: 2024-10-25 | End: 2024-10-25

## 2024-10-25 RX ADMIN — Medication 25 MICROGRAM(S): at 05:13

## 2024-10-25 RX ADMIN — IRON SUCROSE 220 MILLIGRAM(S): 20 INJECTION, SOLUTION INTRAVENOUS at 15:04

## 2024-10-25 RX ADMIN — PANTOPRAZOLE SODIUM 40 MILLIGRAM(S): 40 TABLET, DELAYED RELEASE ORAL at 05:13

## 2024-10-25 NOTE — PROGRESS NOTE ADULT - ASSESSMENT
A/P:  Mr. Melgoza is a 45 yo man with PMH significant for rectal adenoCA (on chemo, follows Dr. Ho as OP), hypothyroidism, esophageal varices, gastric angiectasia who was sent by PCP for hgb 4 admitted for symptomatic anemia.    Labs reviewed:                         7.8    3.31  )-----------( 88       ( 24 Oct 2024 06:55 )             24.8   10-24    142  |  112[H]  |  8   ----------------------------<  91  3.4[L]   |  25  |  0.52    Ca    8.1[L]      24 Oct 2024 06:55  Mg     1.8     10-24    TPro  5.2[L]  /  Alb  2.8[L]  /  TBili  1.4[H]  /  DBili  x   /  AST  29  /  ALT  26  /  AlkPhos  127[H]  10-24    Imaging reviewed:   EGD:  - Normal esophagus in proximal, mid, and distal esophagus were normal. Nogastric varices seen. Scars from previous banding visible.  - Z line normal at 42 cm from incisoirs.  - Small 2 cm hiatal hernia present.  - Normal gastric mucosa. No GAVE seen.  - A small 2 mm gastric outpouching seen in antrum, possible fistula vs diverticulum. No drainage was seen at time of examination.  - Normal duodenum.  Sigmoidoscopy:  - Brown stool in rectum preventing full mucosal examination.  - Rectal mass seen. No bleeding from the mass.  - A 2 x 3 cm clean based ulcer seen adjacent to the rectal mass. This is a possible source of anemia.    #Symptomatic anemia  #LGIB  #Rectal adenoCA  #Hypothyroidism  #Esophageal varies  #Gastric angiectasia  -patient's hgb 4.7, baseline around 8, given 3 units PRBC total so far  -Discussed with GI, patient had EGD today and sigmoidoscopy 10/24, with rectal mass likely the source of anemia.  -consult heme onc for rectal CA  -PPI  -monitor H/H closely, currently hgb 7.8  -  -continue IV fluids, monitor kidney function, electrolyte level while on IVF  -continue levothyroxine  -DVT ppx: SCD    
43 yo M, from home, with pmhx of hypothyroidism, rectal adenocarcinoma esophageal varices, and gastric angiectasia who was sent in from OP provider for hemoglobin 4, REES, and palpitations. In ED, hgb 4.7 s/p 2 units pRBCs. Pt admitted for symptomatic anemia. GI consulted s/p EGD/sigmoidoscopy. Heme also consulted. 
45 yo M, from home, with pmhx of hypothyroidism, rectal adenocarcinoma esophageal varices, and gastric angiectasia who was sent in from OP provider for hemoglobin 4, REES, and palpitations. In ED, hgb 4.7 s/p 2 units pRBCs. Pt admitted for symptomatic anemia.

## 2024-10-25 NOTE — PROGRESS NOTE ADULT - PROBLEM SELECTOR PLAN 6
- from home  - monitor CBC  - pending heme consult
- from home  - pending onc consult  - DC home pending onc recs

## 2024-10-25 NOTE — PROGRESS NOTE ADULT - SUBJECTIVE AND OBJECTIVE BOX
NP Note discussed with  Primary Attending    Patient is a 44y old  Male who presents with a chief complaint of symptomatic anemia (24 Oct 2024 14:40)      INTERVAL HPI/OVERNIGHT EVENTS: no new complaints    MEDICATIONS  (STANDING):  levothyroxine 25 MICROGram(s) Oral daily  mesalamine Enema 4 Gram(s) Rectal at bedtime  pantoprazole    Tablet 40 milliGRAM(s) Oral before breakfast  sodium chloride 0.9%. 1000 milliLiter(s) (75 mL/Hr) IV Continuous <Continuous>    MEDICATIONS  (PRN):  acetaminophen     Tablet .. 650 milliGRAM(s) Oral every 6 hours PRN Temp greater or equal to 38C (100.4F), Mild Pain (1 - 3)  aluminum hydroxide/magnesium hydroxide/simethicone Suspension 30 milliLiter(s) Oral every 4 hours PRN Dyspepsia  melatonin 3 milliGRAM(s) Oral at bedtime PRN Insomnia  ondansetron Injectable 4 milliGRAM(s) IV Push every 8 hours PRN Nausea and/or Vomiting      __________________________________________________  REVIEW OF SYSTEMS:    CONSTITUTIONAL: No fever,   EYES: no acute visual disturbances  NECK: No pain or stiffness  RESPIRATORY: No cough; No shortness of breath  CARDIOVASCULAR: No chest pain, no palpitations  GASTROINTESTINAL: No pain. No nausea or vomiting; No diarrhea   NEUROLOGICAL: No headache or numbness, no tremors  MUSCULOSKELETAL: No joint pain, no muscle pain  GENITOURINARY: no dysuria, no frequency, no hesitancy  PSYCHIATRY: no depression , no anxiety  ALL OTHER  ROS negative        Vital Signs Last 24 Hrs  T(C): 37 (25 Oct 2024 05:24), Max: 37.1 (24 Oct 2024 20:40)  T(F): 98.6 (25 Oct 2024 05:24), Max: 98.8 (25 Oct 2024 00:05)  HR: 67 (25 Oct 2024 06:48) (58 - 73)  BP: 93/55 (25 Oct 2024 06:48) (84/48 - 101/66)  BP(mean): --  RR: 18 (25 Oct 2024 06:48) (16 - 18)  SpO2: 99% (25 Oct 2024 06:48) (98% - 100%)    Parameters below as of 25 Oct 2024 06:48  Patient On (Oxygen Delivery Method): room air        ________________________________________________  PHYSICAL EXAM:  GENERAL: NAD  HEENT: Normocephalic;  conjunctivae and sclerae clear; moist mucous membranes;   NECK : supple  CHEST/LUNG: Clear to auscultation bilaterally with good air entry   HEART: S1 S2  regular; no murmurs, gallops or rubs  ABDOMEN: Soft, Nontender, Nondistended; Bowel sounds present  EXTREMITIES: no cyanosis; no edema; no calf tenderness  SKIN: warm and dry; no rash  NERVOUS SYSTEM:  Awake and alert; Oriented  to place, person and time ; no new deficits    _________________________________________________  LABS:                        7.4    2.79  )-----------( 76       ( 25 Oct 2024 06:30 )             23.6     10-25    142  |  113[H]  |  7   ----------------------------<  91  3.5   |  27  |  0.41[L]    Ca    7.9[L]      25 Oct 2024 06:30  Phos  2.6     10-25  Mg     1.7     10-25    TPro  4.9[L]  /  Alb  2.6[L]  /  TBili  0.9  /  DBili  x   /  AST  26  /  ALT  25  /  AlkPhos  121[H]  10-25    PT/INR - ( 23 Oct 2024 12:05 )   PT: 12.1 sec;   INR: 1.04 ratio         PTT - ( 23 Oct 2024 12:05 )  PTT:26.7 sec  Urinalysis Basic - ( 25 Oct 2024 06:30 )    Color: x / Appearance: x / SG: x / pH: x  Gluc: 91 mg/dL / Ketone: x  / Bili: x / Urobili: x   Blood: x / Protein: x / Nitrite: x   Leuk Esterase: x / RBC: x / WBC x   Sq Epi: x / Non Sq Epi: x / Bacteria: x      CAPILLARY BLOOD GLUCOSE      POCT Blood Glucose.: 104 mg/dL (24 Oct 2024 11:40)        RADIOLOGY & ADDITIONAL TESTS:    Imaging Personally Reviewed:  YES/NO    Consultant(s) Notes Reviewed:   YES/ No    Care Discussed with Consultants :     Plan of care was discussed with patient and /or primary care giver; all questions and concerns were addressed and care was aligned with patient's wishes.     NP Note discussed with  Primary Attending    Patient is a 44y old  Male who presents with a chief complaint of symptomatic anemia (24 Oct 2024 14:40)      INTERVAL HPI/OVERNIGHT EVENTS:  43 yo M, from home, with pmhx of hypothyroidism, rectal adenocarcinoma esophageal varices, and gastric angiectasia who was sent in from OP provider for hemoglobin 4, REES, and palpitations. In ED, hgb 4.7 s/p 2 units pRBCs. Pt admitted for symptomatic anemia. GI consulted s/p EGD/sigmoidoscopy. EGD showed rectal ulcer, possible cause of melena and anemia. Heme consulted, pt follows with Dr Ho OP. Dr Valadez is on call today    Pt denies any further melena     MEDICATIONS  (STANDING):  levothyroxine 25 MICROGram(s) Oral daily  mesalamine Enema 4 Gram(s) Rectal at bedtime  pantoprazole    Tablet 40 milliGRAM(s) Oral before breakfast  sodium chloride 0.9%. 1000 milliLiter(s) (75 mL/Hr) IV Continuous <Continuous>    MEDICATIONS  (PRN):  acetaminophen     Tablet .. 650 milliGRAM(s) Oral every 6 hours PRN Temp greater or equal to 38C (100.4F), Mild Pain (1 - 3)  aluminum hydroxide/magnesium hydroxide/simethicone Suspension 30 milliLiter(s) Oral every 4 hours PRN Dyspepsia  melatonin 3 milliGRAM(s) Oral at bedtime PRN Insomnia  ondansetron Injectable 4 milliGRAM(s) IV Push every 8 hours PRN Nausea and/or Vomiting      __________________________________________________  REVIEW OF SYSTEMS:    CONSTITUTIONAL: No fever,   EYES: no acute visual disturbances  NECK: No pain or stiffness  RESPIRATORY: No cough; No shortness of breath  CARDIOVASCULAR: No chest pain, no palpitations  GASTROINTESTINAL: No pain. No nausea or vomiting; No diarrhea   NEUROLOGICAL: No headache or numbness, no tremors  MUSCULOSKELETAL: No joint pain, no muscle pain  GENITOURINARY: no dysuria, no frequency, no hesitancy  ALL OTHER  ROS negative        Vital Signs Last 24 Hrs  T(C): 37 (25 Oct 2024 05:24), Max: 37.1 (24 Oct 2024 20:40)  T(F): 98.6 (25 Oct 2024 05:24), Max: 98.8 (25 Oct 2024 00:05)  HR: 67 (25 Oct 2024 06:48) (58 - 73)  BP: 93/55 (25 Oct 2024 06:48) (84/48 - 101/66)  BP(mean): --  RR: 18 (25 Oct 2024 06:48) (16 - 18)  SpO2: 99% (25 Oct 2024 06:48) (98% - 100%)    Parameters below as of 25 Oct 2024 06:48  Patient On (Oxygen Delivery Method): room air        ________________________________________________  PHYSICAL EXAM:  GENERAL: NAD  HEENT: Normocephalic; moist mucous membranes;   NECK : supple  CHEST/LUNG: Clear to auscultation bilaterally with good air entry   HEART: S1 S2  regular;  ABDOMEN: Soft, Nontender, Nondistended; Bowel sounds present  EXTREMITIES: no cyanosis; no edema; no calf tenderness  SKIN: warm and dry; no rash  NERVOUS SYSTEM:  Awake and alert; Oriented to place, person and time    _________________________________________________  LABS:                        7.4    2.79  )-----------( 76       ( 25 Oct 2024 06:30 )             23.6     10-25    142  |  113[H]  |  7   ----------------------------<  91  3.5   |  27  |  0.41[L]    Ca    7.9[L]      25 Oct 2024 06:30  Phos  2.6     10-25  Mg     1.7     10-25    TPro  4.9[L]  /  Alb  2.6[L]  /  TBili  0.9  /  DBili  x   /  AST  26  /  ALT  25  /  AlkPhos  121[H]  10-25    PT/INR - ( 23 Oct 2024 12:05 )   PT: 12.1 sec;   INR: 1.04 ratio         PTT - ( 23 Oct 2024 12:05 )  PTT:26.7 sec  Urinalysis Basic - ( 25 Oct 2024 06:30 )    Color: x / Appearance: x / SG: x / pH: x  Gluc: 91 mg/dL / Ketone: x  / Bili: x / Urobili: x   Blood: x / Protein: x / Nitrite: x   Leuk Esterase: x / RBC: x / WBC x   Sq Epi: x / Non Sq Epi: x / Bacteria: x      CAPILLARY BLOOD GLUCOSE      POCT Blood Glucose.: 104 mg/dL (24 Oct 2024 11:40)        RADIOLOGY & ADDITIONAL TESTS:  < from: EGD-Sigmoidoscopy (10.24.24 @ 00:00) >    EGD-Sigmoidoscopy Report        Date: 10/24/2024        Patient Name: ANDREA DIAZ        MRN: [id number]        Account Number:        [id number]        Gender: Male         (age): 1980 (44)        EGD Instrument(s):        GIFHQ 190 ([id number])([id number])        Sigmoidoscopy Instrument(s):        GIFHQ 190 ([id number])([id number])        Attending/Fellow:        Nii Munoz MD        Technician:        Darleen Wallace        Procedure Room #:        PROCEDURE ROOM 2                Anesthesia Provider:        Dr. Mason        Nurse(s):        Angelic Lee RN        ASA Class:        P3 - 10/24/2024 12:37 PM Nii Munoz MD        History of Present Illness:        H&P was previously reviewed.        Administered Medications:        As per Anesthesiology Record        EGD Indications:        Melena: 578.1 - K92.1        GI Bleed: 578.9 - K92.2        Other specified diseases of intestine: 569.89 - K63.89        Sigmoidoscopy Indications:        Melena: 578.1 - K92.1        GI Bleed: 578.9 - K92.2        Rectal cancer: 154.1 - C20        General Procedure:        The procedure, indications, preparation and potential complications were    explained to the patient, who indicated understanding and signed the    corresponding consent forms. MAC was administered. Continuous pulse oximetry and    blood pressure monitoring were used throughout the procedure. Supplemental    oxygen was used.        EGD Procedure:        Patient was placed in left lateral decubitus position. The endoscope was    introduced through mouth and advanced under direct visualization until second    part of the duodenum reached. Patient tolerance to the procedure was good. The    procedure was not difficult. Blood loss was none.        Sigmoidoscopy Procedure:        The quality of preparation was 0-5 on the BBP scale/inadequate. Patient was    placed in left lateral decubitus position. The colonoscope was introduced    through rectum and advanced under direct visualization until cecum and proximal    sigmoid colon reached cm. The colonoscope was retroflexed within the rectum.    Careful visualization was performed as the instrument was withdrawn. Patient    tolerance to the procedure was excellent. The procedure was not difficult..    Blood loss was minimal.        EGD Limitations/Complications:        There were no apparent limitations or complications        Sigmoidoscopy Limitations/Complications:        There were no apparent limitations or complications                        Plan:        Return to floor for further management        Additional Notes:        Procedure report:        EGD:        - Normal esophagus in proximal, mid, and distal esophagus were normal. No    gastric varices seen. Scars from previous banding visible.        - Z linenormal at 42 cm from incisoirs.        - Small 2 cm hiatal hernia present.        - Normal gastric mucosa. No GAVE seen.        - A small 2 mm gastric outpouching seen in antrum, possible fistula vs    diverticulum. No drainge was seen at time of examination.        - Normal duodenum.        Sigmoidoscopy:        - Brown stool in rectum preventing full mucosal examination.        - Rectal mass seen. No bleeding from the mass.        - A 2 x 3 cm clean based ulcer seen adjacent to the rectal mass.This is a    possible source of anemia.        Impression:        - Rectal ulcer.  This is possible cause of patient's melena, anemia in addition    of chemotherapy (normal MCV, pancytopenia (drop in WBC, H/H, Plt))        Plan:        - Can resume diet and home medication.        - Trend H/H        - Start Mesalamine enema at night time for one month.        - Can do PPI 40mg once daily.        - Rest of plan of care as per primary team.        Nii Munoz MD        Version 1, Electronically signed on 10/24/2024 12:46:55 PM by Nii Munoz MD    < end of copied text >    Imaging Personally Reviewed:  YES/NO    Consultant(s) Notes Reviewed:   YES/ No    Care Discussed with Consultants :     Plan of care was discussed with patient and /or primary care giver; all questions and concerns were addressed and care was aligned with patient's wishes.

## 2024-10-25 NOTE — DISCHARGE NOTE PROVIDER - ATTENDING DISCHARGE PHYSICAL EXAMINATION:
PE:  Gen: Oriented, alert, No acute distress Eyes: conjunctivae and lid normal, sclera clear with no icterus ENT: nose and throat exam normal CVS: S1, S2, RRR; no murmur, no rubs, no gallops Pulm: Good air exchange, Breath sounds equal bilaterally, no rales rhonchi,  no wheezes Chest: nontender, no chest deformity, chest movement symmetrical Gl: abdomen soft, nontender, nondistended, bowel sounds normoactive, no masses palpated Musk: no msk pain, no joint swelling Skin: no skin lesions, skin turgor normal, warm and well perfused Neuro: Awake, alert,Psych: normal affect, insight

## 2024-10-25 NOTE — DISCHARGE NOTE PROVIDER - NSDCMRMEDTOKEN_GEN_ALL_CORE_FT
mesalamine 4 g/60 mL rectal enema: 60 milliliter(s) rectal once a day (at bedtime)  pantoprazole 40 mg oral delayed release tablet: 1 tab(s) orally once a day (before a meal)  Synthroid 25 mcg (0.025 mg) oral tablet: 1 tab(s) orally once a day

## 2024-10-25 NOTE — CONSULT NOTE ADULT - ASSESSMENT
1#Stage IV rectal cancer on palliative chemotherapy:  -The patient follows with my colleague Dr. Ray Ho, at [facility] [place]. He is on palliative chemotherapy Folfiri plus cetuximab last dose of chemotherapy was on 5/22/24    #2 Anemia  ­CBC at admission was Hgb 4.70 (his baseline is 8). Due to LGIB from rectal ulcer that found on sigmoidoscopy on 10/24 plus iron deficiency anemia, anemia of chronic disease, and chemotherapy-induced anemia. s/p 2 units of PRBC with a good response, today Hgb 7.4   ­Patient is asymptomatic   ­Recent iron studies c/w iron deficiency (%sat=6, ferritin=46) plan was to give IV iron as pt intolerant to PO supplement due to GI upset but that was postpone due to recent acute anemia and hospital admission.    3# Thrombocytopenia  ­Today 'Plts 76k (from 111K), likely chemotherapy- induced (he received chemotherapy 3 days ago).    ­Pt is asymptomatic; no easy bruising or bleeding.      Recc's:  -IV Venofer 200mg x1 inpatient, then will give IV iron outpatient.    -Simple blood transfusion for hgb < 7  ­At this time no indication for SDP transfusions unless plt count <10K  ­Bleeding precaution instructions as well as avoidance of use NSAIDS/ASA .  -Monitor CBC if stable and no active bleed, there is no restriction from heme/onc team for discharge. The patient will continue follow up with Dr. Ho          1#Stage IV rectal cancer on palliative chemotherapy:  -The patient follows with my colleague Dr. Ray Ho, at [facility] [place]. He is on palliative chemotherapy Folfiri plus cetuximab last dose of chemotherapy was on 5/22/24    #2 Anemia  ­CBC at admission was Hgb 4.70 (his baseline is 8). Due to LGIB from rectal ulcer that found on sigmoidoscopy on 10/24 plus iron deficiency anemia, anemia of chronic disease, and chemotherapy-induced anemia. s/p 2 units of PRBC with a good response, today Hgb 7.4   ­Patient is asymptomatic   ­Recent iron studies c/w iron deficiency (%sat=6, ferritin=46) plan was to give IV iron as pt intolerant to PO supplement due to GI upset but that was postpone due to recent acute anemia and hospital admission.    3# Thrombocytopenia  ­Today 'Plts 76k (from 111K), likely chemotherapy- induced (he received chemotherapy 3 days ago).    ­Pt is asymptomatic; no easy bruising or bleeding.      Recc's:  -IV Venofer 200mg x1 inpatient, then will give IV iron outpatient.    -Simple blood transfusion for hgb < 7  ­At this time no indication for SDP transfusions unless plt count <10K  ­Bleeding precaution instructions as well as avoidance of use NSAIDS/ASA .  -Monitor CBC if stable and no active bleed, there is no restriction from heme/onc team for discharge. The patient will continue follow up with Dr. Ho       Thank you for the consult

## 2024-10-25 NOTE — DISCHARGE NOTE PROVIDER - NSDCCPCAREPLAN_GEN_ALL_CORE_FT
PRINCIPAL DISCHARGE DIAGNOSIS  Diagnosis: Symptomatic anemia  Assessment and Plan of Treatment: You presented with low hemoglobin and received blood transfusion. You were evaluated by gastroenterologist and had endoscopy. Your EGD showed non bleeding rectal ulcer/mass that is likely causing your anemia  You have no further bleeding reported  You received 1 dose of venofer (iron IV) as per oncologist recommendation  Continue mesalamine enema for 1 month  Continue protonix daily      SECONDARY DISCHARGE DIAGNOSES  Diagnosis: Hypothyroidism  Assessment and Plan of Treatment: Continue your synthroid at home    Diagnosis: Esophageal varices  Assessment and Plan of Treatment: Your EGD did not show any esophageal varices    Diagnosis: Rectal adenocarcinoma  Assessment and Plan of Treatment: You follows with Dr Ho outpatient. Dr Valadez recommended iron IV and follow up with their team outpatient

## 2024-10-25 NOTE — PROGRESS NOTE ADULT - PROBLEM SELECTOR PLAN 2
history of rectal adenocarcinoma  - heme consulted
- history of rectal adenocarcinoma, pt follows with Dr Ho  - richardson consulted -- Dr Valadez will see pt today

## 2024-10-25 NOTE — PROGRESS NOTE ADULT - NS ATTEND AMEND GEN_ALL_CORE FT
CC: sent by OP with Hb 4    S:  denies lightheadedness, CP, SOB  O:  Vital Signs Last 24 Hrs  T(C): 37 (10-25-24 @ 05:24), Max: 37.1 (10-24-24 @ 20:40)  T(F): 98.6 (10-25-24 @ 05:24), Max: 98.8 (10-25-24 @ 00:05)  HR: 67 (10-25-24 @ 06:48) (60 - 73)  BP: 93/55 (10-25-24 @ 06:48) (84/48 - 101/66)  RR: 18 (10-25-24 @ 06:48) (17 - 18)  SpO2: 99% (10-25-24 @ 06:48) (98% - 100%)  PE:  Gen: Oriented, alert, No acute distress Eyes: conjunctivae and lid normal, sclera clear with no icterus ENT: nose and throat exam normal CVS: S1, S2, RRR; no murmur, no rubs, no gallops Pulm: Good air exchange, Breath sounds equal bilaterally, no rales rhonchi,  no wheezes Chest: nontender, no chest deformity, chest movement symmetrical Gl: abdomen soft, nontender, nondistended, bowel sounds normoactive, no masses palpated Musk: no msk pain, no joint swelling Skin: no skin lesions, skin turgor normal, warm and well perfused Neuro: Awake, alert,Psych: normal affect, insight          Assessment and Plan:   Mr. Melgoza is a 45 yo man with PMH significant for rectal adenoCA (on chemo, follows Dr. Ho as OP), hypothyroidism, esophageal varices, gastric angiectasia who was sent by PCP for hgb 4 admitted for symptomatic anemia.    Labs reviewed:                              7.4    2.79  )-----------( 76       ( 25 Oct 2024 06:30 )             23.6   10-25    142  |  113[H]  |  7   ----------------------------<  91  3.5   |  27  |  0.41[L]    Ca    7.9[L]      25 Oct 2024 06:30  Phos  2.6     10-25  Mg     1.7     10-25    TPro  4.9[L]  /  Alb  2.6[L]  /  TBili  0.9  /  DBili  x   /  AST  26  /  ALT  25  /  AlkPhos  121[H]  10-25    Imaging reviewed:   EGD:  - Normal esophagus in proximal, mid, and distal esophagus were normal. Nogastric varices seen. Scars from previous banding visible.  - Z line normal at 42 cm from incisoirs.  - Small 2 cm hiatal hernia present.  - Normal gastric mucosa. No GAVE seen.  - A small 2 mm gastric outpouching seen in antrum, possible fistula vs diverticulum. No drainage was seen at time of examination.  - Normal duodenum.  Sigmoidoscopy:  - Brown stool in rectum preventing full mucosal examination.  - Rectal mass seen. No bleeding from the mass.  - A 2 x 3 cm clean based ulcer seen adjacent to the rectal mass. This is a possible source of anemia.    #Symptomatic anemia  #LGIB  #Rectal adenoCA  #Hypothyroidism  #Esophageal varies  #Gastric angiectasia  -patient's hgb 4.7, baseline around 8, given 3 units PRBC total so far  -Discussed with GI, patient had EGD today and sigmoidoscopy 10/24, rectal mass seen, however suspect anemia 2/2 chemo  -mesalamine enema at bedside  -consult heme onc for rectal CA to clear for discharge  -PPI  -monitor H/H closely, currently hgb 7.4  -  -encourage increase PO intake, monitor kidney function, electrolyte level while on IVF  -continue levothyroxine  -DVT ppx: SCD

## 2024-10-25 NOTE — PROGRESS NOTE ADULT - PROBLEM SELECTOR PLAN 4
- history of esophageal varices   - s/p EGD, normal esophagus, no varices
history of esophageal varices   denies coughing up blood  - s/p EGD, normal esophagus

## 2024-10-25 NOTE — DISCHARGE NOTE PROVIDER - HOSPITAL COURSE
43 yo M, from home, with pmhx of hypothyroidism, rectal adenocarcinoma esophageal varices, and gastric angiectasia who was sent in from OP provider for hemoglobin 4, REES, and palpitations. In ED, hgb 4.7 s/p 2 units pRBCs. Pt admitted for symptomatic anemia. GI consulted s/p EGD/sigmoidoscopy. EGD showed rectal ulcer, possible cause of melena and anemia. No further recommendations/interventions from GI. Heme consulted, pt follows with Dr Ho OP. Dr Valadez recommending Venofer 200mg x 1 and follow up with OP oncologist    Pt denies any further melena    Pt is medically optimized for discharge, discussed with the attending Dr Looney  This is just a brief hospital course, please refer to the progress notes for detailed information

## 2024-10-25 NOTE — CONSULT NOTE ADULT - SUBJECTIVE AND OBJECTIVE BOX
Hematology Oncology Consult Note    Patient is a 44y old  Male who presents with a chief complaint of symptomatic anemia (25 Oct 2024 10:52)      SUBJECTIVE / OVERNIGHT EVENTS:    ADDITIONAL REVIEW OF SYSTEMS:    MEDICATIONS  (STANDING):  iron sucrose IVPB 200 milliGRAM(s) IV Intermittent once  levothyroxine 25 MICROGram(s) Oral daily  mesalamine Enema 4 Gram(s) Rectal at bedtime  pantoprazole    Tablet 40 milliGRAM(s) Oral before breakfast  sodium chloride 0.9%. 1000 milliLiter(s) (75 mL/Hr) IV Continuous <Continuous>    MEDICATIONS  (PRN):  acetaminophen     Tablet .. 650 milliGRAM(s) Oral every 6 hours PRN Temp greater or equal to 38C (100.4F), Mild Pain (1 - 3)  aluminum hydroxide/magnesium hydroxide/simethicone Suspension 30 milliLiter(s) Oral every 4 hours PRN Dyspepsia  melatonin 3 milliGRAM(s) Oral at bedtime PRN Insomnia  ondansetron Injectable 4 milliGRAM(s) IV Push every 8 hours PRN Nausea and/or Vomiting    CAPILLARY BLOOD GLUCOSE        I&O's Summary      PHYSICAL EXAM:  Vital Signs Last 24 Hrs  T(C): 37.6 (25 Oct 2024 13:05), Max: 37.6 (25 Oct 2024 13:05)  T(F): 99.6 (25 Oct 2024 13:05), Max: 99.6 (25 Oct 2024 13:05)  HR: 69 (25 Oct 2024 13:05) (60 - 73)  BP: 96/60 (25 Oct 2024 13:05) (84/48 - 96/60)  BP(mean): --  RR: 18 (25 Oct 2024 13:05) (17 - 18)  SpO2: 98% (25 Oct 2024 13:05) (98% - 99%)    Parameters below as of 25 Oct 2024 13:05  Patient On (Oxygen Delivery Method): room air      CONSTITUTIONAL: NAD, well-developed, well-groomed  ENMT: Moist oral mucosa, no pharyngeal injection or exudates; normal dentition  RESPIRATORY: Normal respiratory effort; lungs are clear to auscultation bilaterally  CARDIOVASCULAR: Regular rate and rhythm, normal S1 and S2, no murmur/rub/gallop; No lower extremity edema; Peripheral pulses are 2+ bilaterally  ABDOMEN: Nontender to palpation, normoactive bowel sounds, no rebound/guarding; No hepatosplenomegaly  PSYCH: A+O to person, place, and time; affect appropriate  NEUROLOGY: CN 2-12 are intact and symmetric; no gross sensory deficits   SKIN: No rashes; no palpable lesions    LABS:                        7.4    2.79  )-----------( 76       ( 25 Oct 2024 06:30 )             23.6     10-25    142  |  113[H]  |  7   ----------------------------<  91  3.5   |  27  |  0.41[L]    Ca    7.9[L]      25 Oct 2024 06:30  Phos  2.6     10-25  Mg     1.7     10-25    TPro  4.9[L]  /  Alb  2.6[L]  /  TBili  0.9  /  DBili  x   /  AST  26  /  ALT  25  /  AlkPhos  121[H]  10-25          Urinalysis Basic - ( 25 Oct 2024 06:30 )    Color: x / Appearance: x / SG: x / pH: x  Gluc: 91 mg/dL / Ketone: x  / Bili: x / Urobili: x   Blood: x / Protein: x / Nitrite: x   Leuk Esterase: x / RBC: x / WBC x   Sq Epi: x / Non Sq Epi: x / Bacteria: x    EGD and Sigmoidoscopy:  EGD:  - Normal esophagus in proximal, mid, and distal esophagus were normal. No  gastric varices seen. Scars from previous banding visible.  Small 2 cm hiatal hernia present.  Normal gastric mucosa. No GAVE seen.  A small 2 mm gastric outpouching seen in antrum, possible fistula vs  diverticulum. No drainge was seen at time of examination.  Normal duodenum.    Sigmoidoscopy  - Rectal ulcer. This is possible cause of patient's melena, anemia in addition  of chemotherapy (normal MCV, pancytopenia (drop in WBC, H/H, Plt))          RADIOLOGY & ADDITIONAL TESTS:  Imaging from Last 24 Hours:    Electrocardiogram/QTc Interval:    COORDINATION OF CARE:  Care Discussed with Consultants/Other Providers:   Hematology Oncology Consult Note    Patient is a 44y old  Male who presents with a chief complaint of symptomatic anemia (25 Oct 2024 10:52)      SUBJECTIVE / OVERNIGHT EVENTS: Doing well at baseline.     ADDITIONAL REVIEW OF SYSTEMS:    MEDICATIONS  (STANDING):  iron sucrose IVPB 200 milliGRAM(s) IV Intermittent once  levothyroxine 25 MICROGram(s) Oral daily  mesalamine Enema 4 Gram(s) Rectal at bedtime  pantoprazole    Tablet 40 milliGRAM(s) Oral before breakfast  sodium chloride 0.9%. 1000 milliLiter(s) (75 mL/Hr) IV Continuous <Continuous>    MEDICATIONS  (PRN):  acetaminophen     Tablet .. 650 milliGRAM(s) Oral every 6 hours PRN Temp greater or equal to 38C (100.4F), Mild Pain (1 - 3)  aluminum hydroxide/magnesium hydroxide/simethicone Suspension 30 milliLiter(s) Oral every 4 hours PRN Dyspepsia  melatonin 3 milliGRAM(s) Oral at bedtime PRN Insomnia  ondansetron Injectable 4 milliGRAM(s) IV Push every 8 hours PRN Nausea and/or Vomiting    CAPILLARY BLOOD GLUCOSE        I&O's Summary      PHYSICAL EXAM:  Vital Signs Last 24 Hrs  T(C): 37.6 (25 Oct 2024 13:05), Max: 37.6 (25 Oct 2024 13:05)  T(F): 99.6 (25 Oct 2024 13:05), Max: 99.6 (25 Oct 2024 13:05)  HR: 69 (25 Oct 2024 13:05) (60 - 73)  BP: 96/60 (25 Oct 2024 13:05) (84/48 - 96/60)  BP(mean): --  RR: 18 (25 Oct 2024 13:05) (17 - 18)  SpO2: 98% (25 Oct 2024 13:05) (98% - 99%)    Parameters below as of 25 Oct 2024 13:05  Patient On (Oxygen Delivery Method): room air      CONSTITUTIONAL: NAD, well-developed, well-groomed  ENMT: Moist oral mucosa, no pharyngeal injection or exudates; normal dentition  RESPIRATORY: Normal respiratory effort; lungs are clear to auscultation bilaterally  CARDIOVASCULAR: Regular rate and rhythm, normal S1 and S2, no murmur/rub/gallop; No lower extremity edema; Peripheral pulses are 2+ bilaterally  ABDOMEN: Nontender to palpation, normoactive bowel sounds, no rebound/guarding; No hepatosplenomegaly  PSYCH: A+O to person, place, and time; affect appropriate  NEUROLOGY: CN 2-12 are intact and symmetric; no gross sensory deficits   SKIN: No rashes; no palpable lesions    LABS:                        7.4    2.79  )-----------( 76       ( 25 Oct 2024 06:30 )             23.6     10-25    142  |  113[H]  |  7   ----------------------------<  91  3.5   |  27  |  0.41[L]    Ca    7.9[L]      25 Oct 2024 06:30  Phos  2.6     10-25  Mg     1.7     10-25    TPro  4.9[L]  /  Alb  2.6[L]  /  TBili  0.9  /  DBili  x   /  AST  26  /  ALT  25  /  AlkPhos  121[H]  10-25          Urinalysis Basic - ( 25 Oct 2024 06:30 )    Color: x / Appearance: x / SG: x / pH: x  Gluc: 91 mg/dL / Ketone: x  / Bili: x / Urobili: x   Blood: x / Protein: x / Nitrite: x   Leuk Esterase: x / RBC: x / WBC x   Sq Epi: x / Non Sq Epi: x / Bacteria: x    EGD and Sigmoidoscopy:  EGD:  - Normal esophagus in proximal, mid, and distal esophagus were normal. No  gastric varices seen. Scars from previous banding visible.  Small 2 cm hiatal hernia present.  Normal gastric mucosa. No GAVE seen.  A small 2 mm gastric outpouching seen in antrum, possible fistula vs  diverticulum. No drainge was seen at time of examination.  Normal duodenum.    Sigmoidoscopy  - Rectal ulcer. This is possible cause of patient's melena, anemia in addition  of chemotherapy (normal MCV, pancytopenia (drop in WBC, H/H, Plt))          RADIOLOGY & ADDITIONAL TESTS:  Imaging from Last 24 Hours:    Electrocardiogram/QTc Interval:    COORDINATION OF CARE:  Care Discussed with Consultants/Other Providers: Internal Medicine attending

## 2024-10-25 NOTE — DISCHARGE NOTE PROVIDER - CARE PROVIDER_API CALL
Ray Ho.  Medical Oncology  [address], [address]  [place] [address]  Phone: [phone]  Fax: [phone]  Follow Up Time: 2 weeks

## 2024-10-25 NOTE — CONSULT NOTE ADULT - NS ATTEND AMEND GEN_ALL_CORE FT
Agree with above.    I saw and examined the patient on 10-24-24. I agree with the findings and plan of care as documented in the fellow/resident/medical student/nurse practitioner/physician assistant.    Today we are treating the patient for:   Metastatic rectal adenocarcinoma  Anemia  Melena    ***General note with plan / recommendation:   44 year old with above history. Given the findings on last EGD and colonoscopy, will repeat the procedure today.  Plan on EGD/Sigmoidoscopy today.  NPO. Follow endoscopy report for plan of care.  As patient is having brown stool and MCV is normal I suspect patient's anemia is secondary to his chemotherapy and not from GI bleed.    Billing:  I have reviewed records from labs    Other:  - Thank you for involving us in the care of this patient. If you have any questions regarding the plan of care please do not hesitate to call us back.
Please see above assessment and plan

## 2024-10-25 NOTE — PROGRESS NOTE ADULT - PROBLEM SELECTOR PLAN 3
history of hypothyroidism, takes   - continue synthroid 25 mcg daily
- history of hypothyroidism, takes   - continue synthroid 25 mcg daily

## 2024-10-25 NOTE — PROGRESS NOTE ADULT - PROBLEM SELECTOR PLAN 1
presented with Hgb 4.7 s/p 3 units total  hgb 7.8 this AM  - GI following  - s/p EGD rectal mass seen. No bleeding from the mass. A 2 x 3 cm clean based ulcer seen adjacent to the rectal ass, possible source of anemia  - continue protonix 40 mg daily  - start mesalamine enema for 4 months  - trend CBC
- p/w Hgb 4.7 s/p 2 units total  - hgb 7.4 this AM  - s/p EGD rectal ulcer seen. No bleeding from the mass. A 2 x 3 cm clean based ulcer seen adjacent to the rectal mass, possible source of anemia  - continue protonix 40 mg daily  - continue mesalamine enema for 1 months  - trend CBC  - GI followed -- signed off

## 2024-10-25 NOTE — DISCHARGE NOTE NURSING/CASE MANAGEMENT/SOCIAL WORK - NSDCPEFALRISK_GEN_ALL_CORE
For information on Fall & Injury Prevention, visit: [email or web address] OR  [email or web address] OR  https://www.cdc.gov/steadi/patient.html

## 2024-10-25 NOTE — DISCHARGE NOTE NURSING/CASE MANAGEMENT/SOCIAL WORK - FINANCIAL ASSISTANCE
[facility] provides services at a reduced cost to those who are determined to be eligible through [facility]’s financial assistance program. Information regarding [facility]’s financial assistance program can be found by going to [email or web address] or by calling [phone].

## 2024-10-25 NOTE — DISCHARGE NOTE NURSING/CASE MANAGEMENT/SOCIAL WORK - PATIENT PORTAL LINK FT
You can access the FollowMyHealth Patient Portal offered by [facility] by registering at the following website: [email or web address]. By joining [facility]’s FollowMyHealth portal, you will also be able to view your health information using other applications (apps) compatible with our system.

## 2024-11-11 ENCOUNTER — EMERGENCY (EMERGENCY)
Facility: HOSPITAL | Age: 44
LOS: 1 days | Discharge: HOME | End: 2024-11-11
Attending: STUDENT IN AN ORGANIZED HEALTH CARE EDUCATION/TRAINING PROGRAM
Payer: MEDICAID

## 2024-11-11 VITALS
DIASTOLIC BLOOD PRESSURE: 73 MMHG | OXYGEN SATURATION: 100 % | TEMPERATURE: 98 F | RESPIRATION RATE: 16 BRPM | HEART RATE: 91 BPM | SYSTOLIC BLOOD PRESSURE: 108 MMHG

## 2024-11-11 VITALS
DIASTOLIC BLOOD PRESSURE: 75 MMHG | WEIGHT: 165.35 LBS | HEIGHT: 68 IN | OXYGEN SATURATION: 100 % | TEMPERATURE: 98 F | SYSTOLIC BLOOD PRESSURE: 125 MMHG | HEART RATE: 91 BPM | RESPIRATION RATE: 17 BRPM

## 2024-11-11 DIAGNOSIS — Z90.49 ACQUIRED ABSENCE OF OTHER SPECIFIED PARTS OF DIGESTIVE TRACT: Chronic | ICD-10-CM

## 2024-11-11 PROBLEM — E03.9 HYPOTHYROIDISM, UNSPECIFIED: Chronic | Status: ACTIVE | Noted: 2024-10-23

## 2024-11-11 LAB
ALBUMIN SERPL ELPH-MCNC: 3.2 G/DL — LOW (ref 3.5–5)
ALP SERPL-CCNC: 264 U/L — HIGH (ref 40–120)
ALT FLD-CCNC: 82 U/L DA — HIGH (ref 10–60)
ANION GAP SERPL CALC-SCNC: 5 MMOL/L — SIGNIFICANT CHANGE UP (ref 5–17)
APTT BLD: 27.9 SEC — SIGNIFICANT CHANGE UP (ref 24.5–35.6)
AST SERPL-CCNC: 86 U/L — HIGH (ref 10–40)
BASOPHILS # BLD AUTO: 0.04 K/UL — SIGNIFICANT CHANGE UP (ref 0–0.2)
BASOPHILS NFR BLD AUTO: 0.9 % — SIGNIFICANT CHANGE UP (ref 0–2)
BILIRUB SERPL-MCNC: 0.6 MG/DL — SIGNIFICANT CHANGE UP (ref 0.2–1.2)
BUN SERPL-MCNC: 10 MG/DL — SIGNIFICANT CHANGE UP (ref 7–18)
CALCIUM SERPL-MCNC: 8.3 MG/DL — LOW (ref 8.4–10.5)
CHLORIDE SERPL-SCNC: 109 MMOL/L — HIGH (ref 96–108)
CO2 SERPL-SCNC: 24 MMOL/L — SIGNIFICANT CHANGE UP (ref 22–31)
CREAT SERPL-MCNC: 0.47 MG/DL — LOW (ref 0.5–1.3)
EGFR: 132 ML/MIN/1.73M2 — SIGNIFICANT CHANGE UP
EOSINOPHIL # BLD AUTO: 0.08 K/UL — SIGNIFICANT CHANGE UP (ref 0–0.5)
EOSINOPHIL NFR BLD AUTO: 1.7 % — SIGNIFICANT CHANGE UP (ref 0–6)
FOLATE SERPL-MCNC: 15.3 NG/ML — SIGNIFICANT CHANGE UP
GLUCOSE SERPL-MCNC: 91 MG/DL — SIGNIFICANT CHANGE UP (ref 70–99)
HCT VFR BLD CALC: 21.5 % — LOW (ref 39–50)
HGB BLD-MCNC: 6.6 G/DL — CRITICAL LOW (ref 13–17)
IMM GRANULOCYTES NFR BLD AUTO: 1.9 % — HIGH (ref 0–0.9)
INR BLD: 0.93 RATIO — SIGNIFICANT CHANGE UP (ref 0.85–1.16)
IRON SATN MFR SERPL: 26 UG/DL — LOW (ref 65–170)
IRON SATN MFR SERPL: 6 % — LOW (ref 20–55)
LYMPHOCYTES # BLD AUTO: 0.73 K/UL — LOW (ref 1–3.3)
LYMPHOCYTES # BLD AUTO: 15.5 % — SIGNIFICANT CHANGE UP (ref 13–44)
MCHC RBC-ENTMCNC: 29.5 PG — SIGNIFICANT CHANGE UP (ref 27–34)
MCHC RBC-ENTMCNC: 30.7 G/DL — LOW (ref 32–36)
MCV RBC AUTO: 96 FL — SIGNIFICANT CHANGE UP (ref 80–100)
MONOCYTES # BLD AUTO: 0.33 K/UL — SIGNIFICANT CHANGE UP (ref 0–0.9)
MONOCYTES NFR BLD AUTO: 7 % — SIGNIFICANT CHANGE UP (ref 2–14)
NEUTROPHILS # BLD AUTO: 3.43 K/UL — SIGNIFICANT CHANGE UP (ref 1.8–7.4)
NEUTROPHILS NFR BLD AUTO: 73 % — SIGNIFICANT CHANGE UP (ref 43–77)
NRBC # BLD: 0 /100 WBCS — SIGNIFICANT CHANGE UP (ref 0–0)
PLATELET # BLD AUTO: 107 K/UL — LOW (ref 150–400)
POTASSIUM SERPL-MCNC: 4 MMOL/L — SIGNIFICANT CHANGE UP (ref 3.5–5.3)
POTASSIUM SERPL-SCNC: 4 MMOL/L — SIGNIFICANT CHANGE UP (ref 3.5–5.3)
PROT SERPL-MCNC: 6.1 G/DL — SIGNIFICANT CHANGE UP (ref 6–8.3)
PROTHROM AB SERPL-ACNC: 10.8 SEC — SIGNIFICANT CHANGE UP (ref 9.9–13.4)
RBC # BLD: 2.24 M/UL — LOW (ref 4.2–5.8)
RBC # FLD: 25 % — HIGH (ref 10.3–14.5)
SODIUM SERPL-SCNC: 138 MMOL/L — SIGNIFICANT CHANGE UP (ref 135–145)
TIBC SERPL-MCNC: 405 UG/DL — SIGNIFICANT CHANGE UP (ref 250–450)
UIBC SERPL-MCNC: 379 UG/DL — HIGH (ref 110–370)
VIT B12 SERPL-MCNC: 523 PG/ML — SIGNIFICANT CHANGE UP (ref 232–1245)
WBC # BLD: 4.7 K/UL — SIGNIFICANT CHANGE UP (ref 3.8–10.5)
WBC # FLD AUTO: 4.7 K/UL — SIGNIFICANT CHANGE UP (ref 3.8–10.5)

## 2024-11-11 PROCEDURE — 36430 TRANSFUSION BLD/BLD COMPNT: CPT

## 2024-11-11 PROCEDURE — 86901 BLOOD TYPING SEROLOGIC RH(D): CPT

## 2024-11-11 PROCEDURE — 80053 COMPREHEN METABOLIC PANEL: CPT

## 2024-11-11 PROCEDURE — 85025 COMPLETE CBC W/AUTO DIFF WBC: CPT

## 2024-11-11 PROCEDURE — 82746 ASSAY OF FOLIC ACID SERUM: CPT

## 2024-11-11 PROCEDURE — 99285 EMERGENCY DEPT VISIT HI MDM: CPT | Mod: 25

## 2024-11-11 PROCEDURE — 86850 RBC ANTIBODY SCREEN: CPT

## 2024-11-11 PROCEDURE — 85730 THROMBOPLASTIN TIME PARTIAL: CPT

## 2024-11-11 PROCEDURE — 83550 IRON BINDING TEST: CPT

## 2024-11-11 PROCEDURE — P9040: CPT

## 2024-11-11 PROCEDURE — 86923 COMPATIBILITY TEST ELECTRIC: CPT

## 2024-11-11 PROCEDURE — 36415 COLL VENOUS BLD VENIPUNCTURE: CPT

## 2024-11-11 PROCEDURE — 85610 PROTHROMBIN TIME: CPT

## 2024-11-11 PROCEDURE — 99291 CRITICAL CARE FIRST HOUR: CPT

## 2024-11-11 PROCEDURE — 83540 ASSAY OF IRON: CPT

## 2024-11-11 PROCEDURE — 86900 BLOOD TYPING SEROLOGIC ABO: CPT

## 2024-11-11 PROCEDURE — 82607 VITAMIN B-12: CPT

## 2024-11-11 NOTE — ED ADULT NURSE REASSESSMENT NOTE - NS ED NURSE REASSESS COMMENT FT1
Patient is alert and oriented x3. PRBC'S completed as ordered. Vital signs are stable. No sign of acute distress/blood transfusion reaction noted. Safety maintained.

## 2024-11-11 NOTE — ED ADULT NURSE NOTE - NS ED PATIENT SAFETY CONCERN
-- DO NOT REPLY / DO NOT REPLY ALL --  -- Message is from Engagement Center Operations (ECO) --    General Patient Message patient spouse is calling in stating that patient is experiencing chest pain for 2 days now please call back     Caller Information       Type Contact Phone/Fax    10/04/2022 11:21 AM CDT Phone (Incoming) PEARLDESTINEEANDREW CUMMINS (Emergency Contact) [phone]        Alternative phone number:     Can a detailed message be left? Yes    Message Turnaround:     Is it Working Hours? Yes - Working Hours     [place]:    Please give this turnaround time to the caller:   \"This message will be sent to [state Provider's name]. The clinical team will fulfill your request as soon as they review your message.\"                 No

## 2024-11-11 NOTE — ED ADULT NURSE NOTE - OBJECTIVE STATEMENT
PT presenting with low hemoglobin, hx of rectal CA.  Denying CP, SOB, no other SS of acute distress noted. PT presenting with low hemoglobin, hx of rectal CA.  Denying CP, SOB, no other SS of acute distress noted.  R chest port noted

## 2024-11-11 NOTE — ED ADULT NURSE NOTE - NSFALLHARMRISKINTERV_ED_ALL_ED

## 2024-11-11 NOTE — ED PROVIDER NOTE - PATIENT PORTAL LINK FT
You can access the FollowMyHealth Patient Portal offered by [facility] by registering at the following website: [email or web address]. By joining [facility]’s FollowMyHealth portal, you will also be able to view your health information using other applications (apps) compatible with our system.

## 2024-11-11 NOTE — ED PROVIDER NOTE - PROGRESS NOTE DETAILS
patient hemoglobin 6.6 unchanged from outpatient labs Case discussed with patient's heme-onc Dr. Arroyo per Dr. vásquez patient can follow-up in outpatient after unit of blood transfusion patient endorses understanding otherwise patient signed out to Dr. Doss for blood transfusion patient well-appearing Selam: Transfusion completed.  Will discharge.  Follow-up with hematology.  Return precautions discussed.

## 2024-11-11 NOTE — ED CLERICAL - NS ED CLERK NOTE PRE-ARRIVAL INFORMATION; ADDITIONAL PRE-ARRIVAL INFORMATION
I am sending patient Deonte Melgoza ( 1980) to the ED for urgent PRBC transfusion. Hg=6.60 in the office today.  He is on FOLFIRI+cetuximab for metastatic rectal cancer, most recent tx was 24. He has also been getting IV iron.  He was recently hospitalized (10/23-10/25) with GI bleed from rectal ulcer, and has subsequently been using mesalamine enemas.

## 2024-11-11 NOTE — ED PROVIDER NOTE - CLINICAL SUMMARY MEDICAL DECISION MAKING FREE TEXT BOX
Patient presenting for anemia otherwise history of similar will obtain lab assess for anemia ED observation reassess

## 2024-11-11 NOTE — ED PROVIDER NOTE - OBJECTIVE STATEMENT
44-year-old presenting with anemia on outpatient labs hemoglobin in the 6 patient endorses also noting black stool denies any abdominal pain denies any nausea vomiting focal weakness numbness chest pain shortness of breath

## 2025-01-11 ENCOUNTER — RESULT REVIEW (OUTPATIENT)
Age: 45
End: 2025-01-11

## 2025-01-11 ENCOUNTER — APPOINTMENT (OUTPATIENT)
Dept: CT IMAGING | Facility: IMAGING CENTER | Age: 45
End: 2025-01-11
Payer: MEDICAID

## 2025-01-11 ENCOUNTER — OUTPATIENT (OUTPATIENT)
Dept: OUTPATIENT SERVICES | Facility: HOSPITAL | Age: 45
LOS: 1 days | End: 2025-01-11
Payer: MEDICAID

## 2025-01-11 DIAGNOSIS — Z90.49 ACQUIRED ABSENCE OF OTHER SPECIFIED PARTS OF DIGESTIVE TRACT: Chronic | ICD-10-CM

## 2025-01-11 DIAGNOSIS — C20 MALIGNANT NEOPLASM OF RECTUM: ICD-10-CM

## 2025-01-11 DIAGNOSIS — D72.819 DECREASED WHITE BLOOD CELL COUNT, UNSPECIFIED: ICD-10-CM

## 2025-01-11 PROCEDURE — 74177 CT ABD & PELVIS W/CONTRAST: CPT

## 2025-01-11 PROCEDURE — 74177 CT ABD & PELVIS W/CONTRAST: CPT | Mod: 26

## 2025-01-11 PROCEDURE — 71260 CT THORAX DX C+: CPT

## 2025-01-11 PROCEDURE — 71260 CT THORAX DX C+: CPT | Mod: 26

## 2025-03-27 NOTE — H&P ADULT - NSVTERISKREFERASSESS_GEN_ALL_CORE
Refer to the Assessment tab to view/cancel completed assessment. [Negative] : Psychiatric [FreeTextEntry9] : exam limited by cervical pain [de-identified] : pt denies sx on psych ROS

## 2025-06-18 ENCOUNTER — RESULT REVIEW (OUTPATIENT)
Age: 45
End: 2025-06-18

## 2025-06-18 ENCOUNTER — OUTPATIENT (OUTPATIENT)
Dept: OUTPATIENT SERVICES | Facility: HOSPITAL | Age: 45
LOS: 1 days | End: 2025-06-18
Payer: MEDICAID

## 2025-06-18 ENCOUNTER — APPOINTMENT (OUTPATIENT)
Dept: CT IMAGING | Facility: IMAGING CENTER | Age: 45
End: 2025-06-18
Payer: MEDICAID

## 2025-06-18 DIAGNOSIS — C20 MALIGNANT NEOPLASM OF RECTUM: ICD-10-CM

## 2025-06-18 DIAGNOSIS — Z90.49 ACQUIRED ABSENCE OF OTHER SPECIFIED PARTS OF DIGESTIVE TRACT: Chronic | ICD-10-CM

## 2025-06-18 PROCEDURE — 71260 CT THORAX DX C+: CPT | Mod: 26

## 2025-06-18 PROCEDURE — 74177 CT ABD & PELVIS W/CONTRAST: CPT | Mod: 26

## 2025-06-18 PROCEDURE — 74177 CT ABD & PELVIS W/CONTRAST: CPT

## 2025-06-18 PROCEDURE — 71260 CT THORAX DX C+: CPT

## 2025-07-31 ENCOUNTER — APPOINTMENT (OUTPATIENT)
Dept: COLORECTAL SURGERY | Facility: CLINIC | Age: 45
End: 2025-07-31
Payer: MEDICAID

## 2025-07-31 ENCOUNTER — NON-APPOINTMENT (OUTPATIENT)
Age: 45
End: 2025-07-31

## 2025-07-31 VITALS
RESPIRATION RATE: 16 BRPM | OXYGEN SATURATION: 100 % | TEMPERATURE: 98.4 F | WEIGHT: 170 LBS | HEART RATE: 69 BPM | BODY MASS INDEX: 25.18 KG/M2 | HEIGHT: 69 IN | DIASTOLIC BLOOD PRESSURE: 74 MMHG | SYSTOLIC BLOOD PRESSURE: 113 MMHG

## 2025-07-31 DIAGNOSIS — C78.7 MALIGNANT NEOPLASM OF RECTUM: ICD-10-CM

## 2025-07-31 DIAGNOSIS — C20 MALIGNANT NEOPLASM OF RECTUM: ICD-10-CM

## 2025-07-31 DIAGNOSIS — K60.30 ANAL FISTULA, UNSPECIFIED: ICD-10-CM

## 2025-07-31 DIAGNOSIS — K60.2 ANAL FISSURE, UNSPECIFIED: ICD-10-CM

## 2025-07-31 PROCEDURE — 46600 DIAGNOSTIC ANOSCOPY SPX: CPT

## 2025-07-31 PROCEDURE — 99204 OFFICE O/P NEW MOD 45 MIN: CPT | Mod: 25

## 2025-07-31 RX ORDER — HYDROCORTISONE 25 MG/G
2.5 CREAM TOPICAL
Qty: 1 | Refills: 5 | Status: ACTIVE | COMMUNITY
Start: 2025-07-31 | End: 1900-01-01

## 2025-08-19 ENCOUNTER — OUTPATIENT (OUTPATIENT)
Dept: OUTPATIENT SERVICES | Facility: HOSPITAL | Age: 45
LOS: 1 days | End: 2025-08-19

## 2025-08-19 VITALS
HEART RATE: 71 BPM | SYSTOLIC BLOOD PRESSURE: 102 MMHG | WEIGHT: 169.98 LBS | RESPIRATION RATE: 18 BRPM | OXYGEN SATURATION: 99 % | TEMPERATURE: 97 F | DIASTOLIC BLOOD PRESSURE: 72 MMHG | HEIGHT: 69 IN

## 2025-08-19 DIAGNOSIS — Z90.49 ACQUIRED ABSENCE OF OTHER SPECIFIED PARTS OF DIGESTIVE TRACT: Chronic | ICD-10-CM

## 2025-08-19 DIAGNOSIS — K60.1 CHRONIC ANAL FISSURE: ICD-10-CM

## 2025-08-19 DIAGNOSIS — K60.2 ANAL FISSURE, UNSPECIFIED: ICD-10-CM

## 2025-08-19 RX ORDER — AMOXICILLIN AND CLAVULANATE POTASSIUM 500; 125 MG/1; MG/1
1 TABLET, FILM COATED ORAL
Refills: 0 | DISCHARGE

## 2025-08-27 ENCOUNTER — TRANSCRIPTION ENCOUNTER (OUTPATIENT)
Age: 45
End: 2025-08-27

## 2025-08-27 ENCOUNTER — APPOINTMENT (OUTPATIENT)
Dept: COLORECTAL SURGERY | Facility: HOSPITAL | Age: 45
End: 2025-08-27

## 2025-08-27 ENCOUNTER — OUTPATIENT (OUTPATIENT)
Dept: OUTPATIENT SERVICES | Facility: HOSPITAL | Age: 45
LOS: 1 days | End: 2025-08-27
Payer: MEDICAID

## 2025-08-27 VITALS
OXYGEN SATURATION: 100 % | SYSTOLIC BLOOD PRESSURE: 105 MMHG | RESPIRATION RATE: 16 BRPM | DIASTOLIC BLOOD PRESSURE: 68 MMHG | HEART RATE: 67 BPM

## 2025-08-27 VITALS
RESPIRATION RATE: 16 BRPM | DIASTOLIC BLOOD PRESSURE: 76 MMHG | TEMPERATURE: 98 F | HEART RATE: 62 BPM | WEIGHT: 169.76 LBS | SYSTOLIC BLOOD PRESSURE: 104 MMHG | OXYGEN SATURATION: 99 % | HEIGHT: 69 IN

## 2025-08-27 DIAGNOSIS — K60.1 CHRONIC ANAL FISSURE: ICD-10-CM

## 2025-08-27 DIAGNOSIS — Z90.49 ACQUIRED ABSENCE OF OTHER SPECIFIED PARTS OF DIGESTIVE TRACT: Chronic | ICD-10-CM

## 2025-08-27 PROCEDURE — 88304 TISSUE EXAM BY PATHOLOGIST: CPT | Mod: 26

## 2025-08-27 DEVICE — SURGIFOAM 8 X 12.5CM X 10MM (100): Type: IMPLANTABLE DEVICE | Status: FUNCTIONAL

## 2025-08-27 RX ORDER — OXYCODONE HYDROCHLORIDE 30 MG/1
1 TABLET ORAL
Qty: 3 | Refills: 0
Start: 2025-08-27

## 2025-09-08 PROBLEM — K60.1 CHRONIC ANAL FISSURE: Chronic | Status: ACTIVE | Noted: 2025-08-19

## 2025-09-08 PROBLEM — Z92.3 PERSONAL HISTORY OF IRRADIATION: Chronic | Status: ACTIVE | Noted: 2025-08-19

## 2025-09-08 PROBLEM — I85.00 ESOPHAGEAL VARICES WITHOUT BLEEDING: Chronic | Status: ACTIVE | Noted: 2025-08-19

## 2025-09-08 PROBLEM — C20 MALIGNANT NEOPLASM OF RECTUM: Chronic | Status: ACTIVE | Noted: 2025-08-19

## 2025-09-08 PROBLEM — Z92.21 PERSONAL HISTORY OF ANTINEOPLASTIC CHEMOTHERAPY: Chronic | Status: ACTIVE | Noted: 2025-08-19

## 2025-09-08 LAB — SURGICAL PATHOLOGY STUDY: SIGNIFICANT CHANGE UP

## 2025-09-11 ENCOUNTER — APPOINTMENT (OUTPATIENT)
Dept: COLORECTAL SURGERY | Facility: CLINIC | Age: 45
End: 2025-09-11
Payer: MEDICAID

## 2025-09-11 VITALS
DIASTOLIC BLOOD PRESSURE: 73 MMHG | SYSTOLIC BLOOD PRESSURE: 117 MMHG | HEART RATE: 82 BPM | OXYGEN SATURATION: 99 % | TEMPERATURE: 98.2 F | RESPIRATION RATE: 18 BRPM

## 2025-09-11 DIAGNOSIS — K60.30 ANAL FISTULA, UNSPECIFIED: ICD-10-CM

## 2025-09-11 DIAGNOSIS — K60.2 ANAL FISSURE, UNSPECIFIED: ICD-10-CM

## 2025-09-11 PROCEDURE — 99024 POSTOP FOLLOW-UP VISIT: CPT

## (undated) DEVICE — DRAPE LAPAROTOMY TRANSVERSE

## (undated) DEVICE — BIOPSY FORCEP RADIAL JAW 4 STANDARD WITH NEEDLE

## (undated) DEVICE — CONTAINER FORMALIN 10% 20ML

## (undated) DEVICE — LIGASURE SMALL JAW

## (undated) DEVICE — PACK IV START WITH CHG

## (undated) DEVICE — DRSG 2X2

## (undated) DEVICE — TUBING IV SET GRAVITY 1Y 78" MACRO

## (undated) DEVICE — DRSG CURITY GAUZE SPONGE 4 X 4" 12-PLY NON-STERILE

## (undated) DEVICE — CATH IV SAFE BC 22G X 1" (BLUE)

## (undated) DEVICE — DENTURE CUP PINK

## (undated) DEVICE — SOL INJ NS 0.9% 500ML 1-PORT

## (undated) DEVICE — ANESTHESIA CIRCUIT ADULT HMEF

## (undated) DEVICE — SUT SILK 3-0 18" TIES

## (undated) DEVICE — MASK LRG MED AND HIGH O2 CONC M TO M 10FT

## (undated) DEVICE — MASK OXYGEN PANORAMIC

## (undated) DEVICE — TUBING MEDI-VAC W MAXIGRIP CONNECTORS 1/4"X6'

## (undated) DEVICE — POSITIONER FOAM EGG CRATE ULNAR 2PCS (PINK)

## (undated) DEVICE — UNDERPAD LINEN SAVER 17 X 24"

## (undated) DEVICE — TUBING ENDO EXT OLYMPUS 160 24HR USE GI

## (undated) DEVICE — VENODYNE/SCD SLEEVE CALF MEDIUM

## (undated) DEVICE — SOL IRR POUR NS 0.9% 500ML

## (undated) DEVICE — POSITIONER PATIENT SAFETY STRAP 3X60"

## (undated) DEVICE — NDL HYPO SAFE 22G X 1" (BLACK)

## (undated) DEVICE — ELCTR GROUNDING PAD ADULT COVIDIEN

## (undated) DEVICE — PACK MINOR NO DRAPE

## (undated) DEVICE — SALIVA EJECTOR (BLUE)

## (undated) DEVICE — BASIN SET DOUBLE

## (undated) DEVICE — SYR LUER LOK 3CC

## (undated) DEVICE — SOLIDIFIER 1200CC

## (undated) DEVICE — PREP BETADINE KIT

## (undated) DEVICE — WARMING BLANKET UPPER ADULT

## (undated) DEVICE — TUBING SUCTION NONCONDUCTIVE 6MM X 12FT

## (undated) DEVICE — STERIS DEFENDO 3-PIECE KIT (AIR/WATER, SUCTION & BIOPSY VALVES)

## (undated) DEVICE — ELCTR BOVIE TIP BLADE INSULATED 4" EDGE

## (undated) DEVICE — PLATE NESSY 170

## (undated) DEVICE — KIT ENDO PROCEDURE CUST W/VLV

## (undated) DEVICE — DRSG BANDAID 0.75X3"

## (undated) DEVICE — Device

## (undated) DEVICE — DRSG TELFA .5 X 3

## (undated) DEVICE — LABELS BLANK W PEN

## (undated) DEVICE — WARMING BLANKET FULL ADULT

## (undated) DEVICE — PROBE FIAPC CIRC O.D. 2.3MM/6.9FR LNTH 220CM/7.2FT

## (undated) DEVICE — BITE BLOCK ADULT 20 X 27MM (GREEN)

## (undated) DEVICE — GOWN LG

## (undated) DEVICE — SUT SILK 2 60" TIES

## (undated) DEVICE — LINE BREATHE SAMPLNG

## (undated) DEVICE — LUBRICATING JELLY ONESHOT 1.25OZ

## (undated) DEVICE — DRSG TAPE MEDIPORE 3"

## (undated) DEVICE — FORMALIN CUPS 10% BUFFERED

## (undated) DEVICE — BIOPSY FORCEP COLD DISP

## (undated) DEVICE — SOLIDIFIER ISOLYZER 2000 CC

## (undated) DEVICE — LUBRICATING JELLY HR ONE SHOT 3G

## (undated) DEVICE — TUBING IV SET GRAVITY 3Y 100" MACRO

## (undated) DEVICE — ELCTR ECG CONDUCTIVE ADHESIVE

## (undated) DEVICE — VESSEL LOOP MAXI-YELLOW  0.120" X 16"

## (undated) DEVICE — ELCTR ROCKER SWITCH PENCIL BLUE 10FT

## (undated) DEVICE — SOL IRR POUR H2O 500ML

## (undated) DEVICE — BASIN EMESIS 10IN GRADUATED MAUVE